# Patient Record
Sex: MALE | Race: WHITE | NOT HISPANIC OR LATINO
[De-identification: names, ages, dates, MRNs, and addresses within clinical notes are randomized per-mention and may not be internally consistent; named-entity substitution may affect disease eponyms.]

---

## 2017-08-15 ENCOUNTER — APPOINTMENT (OUTPATIENT)
Dept: CARDIOLOGY | Facility: CLINIC | Age: 57
End: 2017-08-15
Payer: COMMERCIAL

## 2017-08-15 VITALS
DIASTOLIC BLOOD PRESSURE: 72 MMHG | HEART RATE: 73 BPM | RESPIRATION RATE: 16 BRPM | WEIGHT: 225 LBS | HEIGHT: 71 IN | BODY MASS INDEX: 31.5 KG/M2 | OXYGEN SATURATION: 98 % | SYSTOLIC BLOOD PRESSURE: 130 MMHG

## 2017-08-15 DIAGNOSIS — I73.9 PERIPHERAL VASCULAR DISEASE, UNSPECIFIED: ICD-10-CM

## 2017-08-15 DIAGNOSIS — Z82.3 FAMILY HISTORY OF STROKE: ICD-10-CM

## 2017-08-15 DIAGNOSIS — F17.210 NICOTINE DEPENDENCE, CIGARETTES, UNCOMPLICATED: ICD-10-CM

## 2017-08-15 DIAGNOSIS — I65.21 OCCLUSION AND STENOSIS OF RIGHT CAROTID ARTERY: ICD-10-CM

## 2017-08-15 PROCEDURE — 99205 OFFICE O/P NEW HI 60 MIN: CPT

## 2022-04-19 ENCOUNTER — APPOINTMENT (OUTPATIENT)
Dept: PAIN MANAGEMENT | Facility: CLINIC | Age: 62
End: 2022-04-19
Payer: COMMERCIAL

## 2022-04-19 VITALS — WEIGHT: 235 LBS | HEIGHT: 70 IN | BODY MASS INDEX: 33.64 KG/M2

## 2022-04-19 DIAGNOSIS — M54.50 LOW BACK PAIN, UNSPECIFIED: ICD-10-CM

## 2022-04-19 DIAGNOSIS — M54.17 RADICULOPATHY, LUMBOSACRAL REGION: ICD-10-CM

## 2022-04-19 PROCEDURE — 99213 OFFICE O/P EST LOW 20 MIN: CPT

## 2022-04-19 NOTE — DISCUSSION/SUMMARY
[de-identified] : After discussing various treatment options with the patient including but not limited to oral medications, physical therapy, exercise modalities as well as interventional spinal injections, we have decided with the following plan:\par \par - Continue home exercises, stretching, activity modification, physical therapy, and conservative care.\par - Follow-up as needed.\par - Recommend to follow-up with a spine specialist for surgical consultation.\par - Will prescribe Oxycodone/Acetaminophen 5/325 Q4-6hrs PRN #28 for pain control.\par

## 2022-04-19 NOTE — PHYSICAL EXAM
[de-identified] : Constitutional; Appears well, no apparent distress\par Ability to communicate: Normal \par Respiratory: non-labored breathing\par Skin: No rash noted\par Head: Normocephalic, atraumatic\par Neck: no visible thyroid enlargement\par Eyes: Extraocular movements intact\par Neurologic: Alert and oriented x3\par Psychiatric: normal mood, affect and behavior \par \par  [] : motor exam is 5/5 throughout both lower extremities with normal tone

## 2022-04-19 NOTE — HISTORY OF PRESENT ILLNESS
[Lower back] : lower back [0] : 0 [6] : 6 [Radiating] : radiating [Sharp] : sharp [Tingling] : tingling [] : yes [Constant] : constant [Leisure] : leisure [Sleep] : sleep [Walking/activity] : walking/activity [Injection therapy] : injection therapy [Sitting] : sitting [Lying in bed] : lying in bed [FreeTextEntry1] : left  [FreeTextEntry6] : numbing  [FreeTextEntry7] : left leg to the toes

## 2022-09-21 ENCOUNTER — APPOINTMENT (OUTPATIENT)
Dept: GASTROENTEROLOGY | Facility: CLINIC | Age: 62
End: 2022-09-21

## 2022-09-21 VITALS
OXYGEN SATURATION: 97 % | HEART RATE: 60 BPM | HEIGHT: 71 IN | TEMPERATURE: 97.8 F | SYSTOLIC BLOOD PRESSURE: 142 MMHG | BODY MASS INDEX: 30.94 KG/M2 | RESPIRATION RATE: 12 BRPM | DIASTOLIC BLOOD PRESSURE: 80 MMHG | WEIGHT: 221 LBS

## 2022-09-21 DIAGNOSIS — R07.9 CHEST PAIN, UNSPECIFIED: ICD-10-CM

## 2022-09-21 PROCEDURE — 99204 OFFICE O/P NEW MOD 45 MIN: CPT

## 2022-09-21 RX ORDER — OXYCODONE AND ACETAMINOPHEN 5; 325 MG/1; MG/1
5-325 TABLET ORAL
Qty: 28 | Refills: 0 | Status: DISCONTINUED | COMMUNITY
Start: 2022-04-19 | End: 2022-09-21

## 2022-09-22 NOTE — CURRENT MEDS
[FreeTextEntry1] : Patient has been given a trial of omeprazole which is not helping his symptoms\par Patient is currently taking a blood pressure pill and had a pill for cholesterol the names of which he cannot recall.

## 2022-09-22 NOTE — HISTORY OF PRESENT ILLNESS
[FreeTextEntry1] : Patient has never had a colonoscopy [de-identified] : CT angiogram performed August 17, 2022 revealed diffuse atherosclerotic changes with no discrete luminal stenosis of greater than 50%.

## 2022-09-22 NOTE — REASON FOR VISIT
[Initial Evaluation] : an initial evaluation [FreeTextEntry1] : 62-year-old male complaining of chest pain

## 2022-09-22 NOTE — REVIEW OF SYSTEMS
[Chest Pain] : chest pain [Cough] : cough [Shortness Of Breath] : no shortness of breath [SOB on Exertion] : no shortness of breath during exertion [Abdominal Pain] : no abdominal pain [Vomiting] : no vomiting [Constipation] : no constipation [Diarrhea] : no diarrhea [Heartburn] : no heartburn [Melena (black stool)] : no melena [Bleeding] : no bleeding [Fecal Incontinence (soiling)] : no fecal incontinence [Bloating (gassiness)] : no bloating

## 2022-09-22 NOTE — PHYSICAL EXAM
[Normal] : normal bowel sounds, non-tender, no masses, soft, no no hepato-splenomegaly [de-identified] : + wheeze [de-identified] : Deferred

## 2022-09-22 NOTE — ASSESSMENT
[FreeTextEntry1] : 62-year-old white male with significant history of tobacco use and signs of vascular disease which include lower extremity claudication and right carotid stenotic disease complaining of chest pain which may be cardiac in nature but reportedly work-up to date has not revealed significant cardiac disease.  We will consider upper endoscopy to rule out GI etiology of chest pain after cardiology clearance by his cardiologist Dr. Helms.  Patient has had a trial of omeprazole without improvement of his intermittent chest pain.

## 2022-10-13 ENCOUNTER — OUTPATIENT (OUTPATIENT)
Dept: OUTPATIENT SERVICES | Facility: HOSPITAL | Age: 62
LOS: 1 days | End: 2022-10-13
Payer: COMMERCIAL

## 2022-10-13 ENCOUNTER — TRANSCRIPTION ENCOUNTER (OUTPATIENT)
Age: 62
End: 2022-10-13

## 2022-10-13 VITALS
HEART RATE: 53 BPM | SYSTOLIC BLOOD PRESSURE: 132 MMHG | WEIGHT: 218.04 LBS | HEIGHT: 60.11 IN | RESPIRATION RATE: 15 BRPM | TEMPERATURE: 98 F | DIASTOLIC BLOOD PRESSURE: 83 MMHG | OXYGEN SATURATION: 95 %

## 2022-10-13 VITALS
OXYGEN SATURATION: 97 % | RESPIRATION RATE: 14 BRPM | SYSTOLIC BLOOD PRESSURE: 154 MMHG | HEART RATE: 60 BPM | DIASTOLIC BLOOD PRESSURE: 73 MMHG

## 2022-10-13 DIAGNOSIS — I25.10 ATHEROSCLEROTIC HEART DISEASE OF NATIVE CORONARY ARTERY WITHOUT ANGINA PECTORIS: ICD-10-CM

## 2022-10-13 LAB
ALBUMIN SERPL ELPH-MCNC: 4.7 G/DL — SIGNIFICANT CHANGE UP (ref 3.3–5)
ALP SERPL-CCNC: 80 U/L — SIGNIFICANT CHANGE UP (ref 40–120)
ALT FLD-CCNC: 24 U/L — SIGNIFICANT CHANGE UP (ref 10–45)
ANION GAP SERPL CALC-SCNC: 10 MMOL/L — SIGNIFICANT CHANGE UP (ref 5–17)
AST SERPL-CCNC: 19 U/L — SIGNIFICANT CHANGE UP (ref 10–40)
BILIRUB SERPL-MCNC: 0.3 MG/DL — SIGNIFICANT CHANGE UP (ref 0.2–1.2)
BUN SERPL-MCNC: 21 MG/DL — SIGNIFICANT CHANGE UP (ref 7–23)
CALCIUM SERPL-MCNC: 9.7 MG/DL — SIGNIFICANT CHANGE UP (ref 8.4–10.5)
CHLORIDE SERPL-SCNC: 102 MMOL/L — SIGNIFICANT CHANGE UP (ref 96–108)
CO2 SERPL-SCNC: 29 MMOL/L — SIGNIFICANT CHANGE UP (ref 22–31)
CREAT SERPL-MCNC: 0.72 MG/DL — SIGNIFICANT CHANGE UP (ref 0.5–1.3)
EGFR: 103 ML/MIN/1.73M2 — SIGNIFICANT CHANGE UP
GLUCOSE SERPL-MCNC: 88 MG/DL — SIGNIFICANT CHANGE UP (ref 70–99)
HCT VFR BLD CALC: 46.7 % — SIGNIFICANT CHANGE UP (ref 39–50)
HGB BLD-MCNC: 15.4 G/DL — SIGNIFICANT CHANGE UP (ref 13–17)
MCHC RBC-ENTMCNC: 30.3 PG — SIGNIFICANT CHANGE UP (ref 27–34)
MCHC RBC-ENTMCNC: 33 GM/DL — SIGNIFICANT CHANGE UP (ref 32–36)
MCV RBC AUTO: 91.9 FL — SIGNIFICANT CHANGE UP (ref 80–100)
NRBC # BLD: 0 /100 WBCS — SIGNIFICANT CHANGE UP (ref 0–0)
PLATELET # BLD AUTO: 286 K/UL — SIGNIFICANT CHANGE UP (ref 150–400)
POTASSIUM SERPL-MCNC: 4.3 MMOL/L — SIGNIFICANT CHANGE UP (ref 3.5–5.3)
POTASSIUM SERPL-SCNC: 4.3 MMOL/L — SIGNIFICANT CHANGE UP (ref 3.5–5.3)
PROT SERPL-MCNC: 7.7 G/DL — SIGNIFICANT CHANGE UP (ref 6–8.3)
RBC # BLD: 5.08 M/UL — SIGNIFICANT CHANGE UP (ref 4.2–5.8)
RBC # FLD: 12.2 % — SIGNIFICANT CHANGE UP (ref 10.3–14.5)
SODIUM SERPL-SCNC: 141 MMOL/L — SIGNIFICANT CHANGE UP (ref 135–145)
WBC # BLD: 7.53 K/UL — SIGNIFICANT CHANGE UP (ref 3.8–10.5)
WBC # FLD AUTO: 7.53 K/UL — SIGNIFICANT CHANGE UP (ref 3.8–10.5)

## 2022-10-13 PROCEDURE — 93010 ELECTROCARDIOGRAM REPORT: CPT | Mod: 76

## 2022-10-13 PROCEDURE — C1769: CPT

## 2022-10-13 PROCEDURE — 93005 ELECTROCARDIOGRAM TRACING: CPT

## 2022-10-13 PROCEDURE — 85027 COMPLETE CBC AUTOMATED: CPT

## 2022-10-13 PROCEDURE — 92928 PRQ TCAT PLMT NTRAC ST 1 LES: CPT | Mod: LD

## 2022-10-13 PROCEDURE — 93454 CORONARY ARTERY ANGIO S&I: CPT | Mod: 26,59

## 2022-10-13 PROCEDURE — C9600: CPT | Mod: LD

## 2022-10-13 PROCEDURE — 36415 COLL VENOUS BLD VENIPUNCTURE: CPT

## 2022-10-13 PROCEDURE — C1725: CPT

## 2022-10-13 PROCEDURE — C1894: CPT

## 2022-10-13 PROCEDURE — C1874: CPT

## 2022-10-13 PROCEDURE — 80053 COMPREHEN METABOLIC PANEL: CPT

## 2022-10-13 PROCEDURE — 99152 MOD SED SAME PHYS/QHP 5/>YRS: CPT

## 2022-10-13 PROCEDURE — 93454 CORONARY ARTERY ANGIO S&I: CPT | Mod: 59

## 2022-10-13 PROCEDURE — C1887: CPT

## 2022-10-13 RX ORDER — SODIUM CHLORIDE 9 MG/ML
1000 INJECTION INTRAMUSCULAR; INTRAVENOUS; SUBCUTANEOUS
Refills: 0 | Status: DISCONTINUED | OUTPATIENT
Start: 2022-10-13 | End: 2022-10-27

## 2022-10-13 RX ORDER — CLOPIDOGREL BISULFATE 75 MG/1
1 TABLET, FILM COATED ORAL
Qty: 90 | Refills: 3
Start: 2022-10-13 | End: 2023-10-07

## 2022-10-13 RX ORDER — SODIUM CHLORIDE 9 MG/ML
250 INJECTION INTRAMUSCULAR; INTRAVENOUS; SUBCUTANEOUS ONCE
Refills: 0 | Status: COMPLETED | OUTPATIENT
Start: 2022-10-13 | End: 2022-10-13

## 2022-10-13 RX ADMIN — SODIUM CHLORIDE 750 MILLILITER(S): 9 INJECTION INTRAMUSCULAR; INTRAVENOUS; SUBCUTANEOUS at 10:04

## 2022-10-13 RX ADMIN — SODIUM CHLORIDE 50 MILLILITER(S): 9 INJECTION INTRAMUSCULAR; INTRAVENOUS; SUBCUTANEOUS at 10:05

## 2022-10-13 RX ADMIN — SODIUM CHLORIDE 180 MILLILITER(S): 9 INJECTION INTRAMUSCULAR; INTRAVENOUS; SUBCUTANEOUS at 12:47

## 2022-10-13 NOTE — H&P CARDIOLOGY - NSICDXPASTMEDICALHX_GEN_ALL_CORE_FT
PAST MEDICAL HISTORY:  Carotid stenosis     Emphysematous COPD     HLD (hyperlipidemia)      PAST MEDICAL HISTORY:  Asthma     Carotid stenosis     Emphysematous COPD     HLD (hyperlipidemia)

## 2022-10-13 NOTE — ASU PATIENT PROFILE, ADULT - FALL HARM RISK - UNIVERSAL INTERVENTIONS
Bed in lowest position, wheels locked, appropriate side rails in place/Call bell, personal items and telephone in reach/Instruct patient to call for assistance before getting out of bed or chair/Non-slip footwear when patient is out of bed/[place] to call system/Physically safe environment - no spills, clutter or unnecessary equipment/Purposeful Proactive Rounding/Room/bathroom lighting operational, light cord in reach

## 2022-10-13 NOTE — H&P CARDIOLOGY - RS GEN PE MLT RESP DETAILS PC
airway patent/breath sounds equal/respirations non-labored/diminished breath sounds, L/diminished breath sounds, R

## 2022-10-13 NOTE — H&P CARDIOLOGY - HISTORY OF PRESENT ILLNESS
61 y/o male, current smoker, with PMHx of COPD, stable multivessel CAD, HTN, HLD, and Carotid stenosis offering c/o class II anginal exertional left sided chest pain radiating to the left arm x 6 weeks.  Patient was evaluated by his Cardiologist- Dr. ERICA Helms and is now s/p CTA of the coronaries with total coronary calcium score of 1703 with atherosclerotic calcification distributed as follows:  LM 84, , LCx 251, RCA 1119.  Patient is now for [facility].  61 y/o male, current smoker, with PMHx of Asthma, COPD, stable multivessel CAD, HTN, HLD, and Carotid stenosis offering c/o class II anginal exertional left sided chest pain radiating to the left arm x 6 weeks.  Patient was evaluated by his Cardiologist- Dr. ERICA Helms and is now s/p CTA of the coronaries with total coronary calcium score of 1703 with atherosclerotic calcification distributed as follows:  LM 84, , LCx 251, RCA 1119.  Patient is now for [facility].

## 2022-10-13 NOTE — ASU DISCHARGE PLAN (ADULT/PEDIATRIC) - NS MD DC FALL RISK RISK
For information on Fall & Injury Prevention, visit: [email or web address] OR  [email or web address] OR  https://www.cdc.gov/steadi/patient.html

## 2022-10-13 NOTE — ASU DISCHARGE PLAN (ADULT/PEDIATRIC) - PROCEDURE
Cardiac catheterization with 2 stents to the left anterior descending artery
I supervised PA fellow care

## 2022-10-13 NOTE — ASU DISCHARGE PLAN (ADULT/PEDIATRIC) - CARE PROVIDER_API CALL
Piyush Helms (DO)  Cardiology; Internal Medicine; Nuclear Cardiology  [facility], [address] [place] [address], [place] [address]  Phone: [phone]  Fax: [phone]  Established Patient  Follow Up Time: 2 weeks

## 2022-10-13 NOTE — ASU DISCHARGE PLAN (ADULT/PEDIATRIC) - ASU DC SPECIAL INSTRUCTIONSFT

## 2022-12-13 PROBLEM — J45.909 UNSPECIFIED ASTHMA, UNCOMPLICATED: Chronic | Status: ACTIVE | Noted: 2022-10-13

## 2022-12-13 PROBLEM — I65.29 OCCLUSION AND STENOSIS OF UNSPECIFIED CAROTID ARTERY: Chronic | Status: ACTIVE | Noted: 2022-10-13

## 2022-12-13 PROBLEM — J43.9 EMPHYSEMA, UNSPECIFIED: Chronic | Status: ACTIVE | Noted: 2022-10-13

## 2022-12-13 PROBLEM — E78.5 HYPERLIPIDEMIA, UNSPECIFIED: Chronic | Status: ACTIVE | Noted: 2022-10-13

## 2022-12-20 ENCOUNTER — APPOINTMENT (OUTPATIENT)
Dept: ELECTROPHYSIOLOGY | Facility: CLINIC | Age: 62
End: 2022-12-20

## 2022-12-20 VITALS
SYSTOLIC BLOOD PRESSURE: 142 MMHG | WEIGHT: 228 LBS | BODY MASS INDEX: 31.92 KG/M2 | RESPIRATION RATE: 14 BRPM | OXYGEN SATURATION: 98 % | HEIGHT: 71 IN | DIASTOLIC BLOOD PRESSURE: 90 MMHG | HEART RATE: 91 BPM

## 2022-12-20 PROCEDURE — 93000 ELECTROCARDIOGRAM COMPLETE: CPT

## 2022-12-20 PROCEDURE — 99204 OFFICE O/P NEW MOD 45 MIN: CPT | Mod: 25

## 2022-12-20 RX ORDER — RIVAROXABAN 20 MG/1
20 TABLET, FILM COATED ORAL
Qty: 90 | Refills: 1 | Status: ACTIVE | COMMUNITY

## 2022-12-20 RX ORDER — ATORVASTATIN CALCIUM 40 MG/1
40 TABLET, FILM COATED ORAL
Qty: 90 | Refills: 3 | Status: ACTIVE | COMMUNITY

## 2022-12-20 RX ORDER — CLOPIDOGREL 75 MG/1
75 TABLET, FILM COATED ORAL DAILY
Qty: 90 | Refills: 0 | Status: ACTIVE | COMMUNITY
Start: 2022-12-20

## 2022-12-25 ENCOUNTER — NON-APPOINTMENT (OUTPATIENT)
Age: 62
End: 2022-12-25

## 2022-12-25 NOTE — HISTORY OF PRESENT ILLNESS
[FreeTextEntry1] : I had the pleasure of seeing Mark Oseguera today for consultation for AFL management in the arrhythmia clinic at [facility]. As you well know, he is a pleasant 62 year old gentleman with a history of HTN, CAD s/p PCI to the proximal LAD (10/13/22), hyperlipidemia, peripheral vascular disease, asthma, and newly diagnosed atrial flutter.  About a week ago, patient went to see his cardiologist and complained of occasional palpitations that began about 5 days prior to that visit.  He was found to be in atrial flutter and was started on Xarelto.  Patient reports he has occasional palpitations but otherwise has been feeling well.  He denies chest pain, shortness of breath, near-syncope, or syncope.\par

## 2022-12-25 NOTE — CARDIOLOGY SUMMARY
[de-identified] : 12/20/2022: MAGNO @ 94bpm [de-identified] : 8/3/22: EF 60%, normal LA size (LA size 4.8 cm), and no significant valvular abnormalities.

## 2022-12-25 NOTE — DISCUSSION/SUMMARY
[EKG obtained to assist in diagnosis and management of assessed problem(s)] : EKG obtained to assist in diagnosis and management of assessed problem(s) [FreeTextEntry1] : In summary, Mr. Oseguera is a 62 year old gentleman with a history of HTN, CAD s/p PCI to the proximal LAD (10/13/22), hyperlipidemia, peripheral vascular disease, asthma, and newly diagnosed atrial flutter. We discussed the pathophysiology of atrial flutter including management strategies. The options of a cardioversion and a catheter ablation were discussed. Risks and benefits were discussed with each option. The procedures, outcomes and risks of ablation were reviewed.  We discussed that at the very least he should proceed with a cardioversion and assess if he feels any improvement in his functional status and symptoms.  Patient would like to discuss further with his wife at home and will contact us with his decision on how he would like to proceed.\par \par

## 2023-01-18 ENCOUNTER — NON-APPOINTMENT (OUTPATIENT)
Age: 63
End: 2023-01-18

## 2023-01-20 ENCOUNTER — TRANSCRIPTION ENCOUNTER (OUTPATIENT)
Age: 63
End: 2023-01-20

## 2023-01-20 ENCOUNTER — OUTPATIENT (OUTPATIENT)
Dept: INPATIENT UNIT | Facility: HOSPITAL | Age: 63
LOS: 1 days | End: 2023-01-20
Payer: COMMERCIAL

## 2023-01-20 VITALS
OXYGEN SATURATION: 97 % | RESPIRATION RATE: 16 BRPM | HEART RATE: 68 BPM | DIASTOLIC BLOOD PRESSURE: 62 MMHG | TEMPERATURE: 98 F | SYSTOLIC BLOOD PRESSURE: 104 MMHG

## 2023-01-20 VITALS — HEIGHT: 71 IN | WEIGHT: 227.96 LBS

## 2023-01-20 DIAGNOSIS — Z95.5 PRESENCE OF CORONARY ANGIOPLASTY IMPLANT AND GRAFT: Chronic | ICD-10-CM

## 2023-01-20 DIAGNOSIS — I48.3 TYPICAL ATRIAL FLUTTER: ICD-10-CM

## 2023-01-20 LAB
ANION GAP SERPL CALC-SCNC: 11 MMOL/L — SIGNIFICANT CHANGE UP (ref 5–17)
APTT BLD: 39.3 SEC — HIGH (ref 27.5–35.5)
BLD GP AB SCN SERPL QL: NEGATIVE — SIGNIFICANT CHANGE UP
BUN SERPL-MCNC: 21 MG/DL — SIGNIFICANT CHANGE UP (ref 7–23)
CALCIUM SERPL-MCNC: 9.6 MG/DL — SIGNIFICANT CHANGE UP (ref 8.4–10.5)
CHLORIDE SERPL-SCNC: 105 MMOL/L — SIGNIFICANT CHANGE UP (ref 96–108)
CO2 SERPL-SCNC: 22 MMOL/L — SIGNIFICANT CHANGE UP (ref 22–31)
CREAT SERPL-MCNC: 0.62 MG/DL — SIGNIFICANT CHANGE UP (ref 0.5–1.3)
EGFR: 108 ML/MIN/1.73M2 — SIGNIFICANT CHANGE UP
GLUCOSE SERPL-MCNC: 92 MG/DL — SIGNIFICANT CHANGE UP (ref 70–99)
HCT VFR BLD CALC: 43.2 % — SIGNIFICANT CHANGE UP (ref 39–50)
HGB BLD-MCNC: 14.1 G/DL — SIGNIFICANT CHANGE UP (ref 13–17)
INR BLD: 1.66 RATIO — HIGH (ref 0.88–1.16)
MAGNESIUM SERPL-MCNC: 2.1 MG/DL — SIGNIFICANT CHANGE UP (ref 1.6–2.6)
MCHC RBC-ENTMCNC: 30 PG — SIGNIFICANT CHANGE UP (ref 27–34)
MCHC RBC-ENTMCNC: 32.6 GM/DL — SIGNIFICANT CHANGE UP (ref 32–36)
MCV RBC AUTO: 91.9 FL — SIGNIFICANT CHANGE UP (ref 80–100)
NRBC # BLD: 0 /100 WBCS — SIGNIFICANT CHANGE UP (ref 0–0)
PLATELET # BLD AUTO: 426 K/UL — HIGH (ref 150–400)
POTASSIUM SERPL-MCNC: 5 MMOL/L — SIGNIFICANT CHANGE UP (ref 3.5–5.3)
POTASSIUM SERPL-SCNC: 5 MMOL/L — SIGNIFICANT CHANGE UP (ref 3.5–5.3)
PROTHROM AB SERPL-ACNC: 19.2 SEC — HIGH (ref 10.5–13.4)
RBC # BLD: 4.7 M/UL — SIGNIFICANT CHANGE UP (ref 4.2–5.8)
RBC # FLD: 12.3 % — SIGNIFICANT CHANGE UP (ref 10.3–14.5)
RH IG SCN BLD-IMP: POSITIVE — SIGNIFICANT CHANGE UP
RH IG SCN BLD-IMP: POSITIVE — SIGNIFICANT CHANGE UP
SODIUM SERPL-SCNC: 138 MMOL/L — SIGNIFICANT CHANGE UP (ref 135–145)
WBC # BLD: 9.38 K/UL — SIGNIFICANT CHANGE UP (ref 3.8–10.5)
WBC # FLD AUTO: 9.38 K/UL — SIGNIFICANT CHANGE UP (ref 3.8–10.5)

## 2023-01-20 PROCEDURE — 86901 BLOOD TYPING SEROLOGIC RH(D): CPT

## 2023-01-20 PROCEDURE — 86850 RBC ANTIBODY SCREEN: CPT

## 2023-01-20 PROCEDURE — 85027 COMPLETE CBC AUTOMATED: CPT

## 2023-01-20 PROCEDURE — 33285 INSJ SUBQ CAR RHYTHM MNTR: CPT

## 2023-01-20 PROCEDURE — C1764: CPT

## 2023-01-20 PROCEDURE — 83735 ASSAY OF MAGNESIUM: CPT

## 2023-01-20 PROCEDURE — 85610 PROTHROMBIN TIME: CPT

## 2023-01-20 PROCEDURE — 93005 ELECTROCARDIOGRAM TRACING: CPT

## 2023-01-20 PROCEDURE — 80048 BASIC METABOLIC PNL TOTAL CA: CPT

## 2023-01-20 PROCEDURE — 85730 THROMBOPLASTIN TIME PARTIAL: CPT

## 2023-01-20 PROCEDURE — 93653 COMPRE EP EVAL TX SVT: CPT

## 2023-01-20 PROCEDURE — 86900 BLOOD TYPING SEROLOGIC ABO: CPT

## 2023-01-20 RX ORDER — SODIUM CHLORIDE 9 MG/ML
3 INJECTION INTRAMUSCULAR; INTRAVENOUS; SUBCUTANEOUS EVERY 8 HOURS
Refills: 0 | Status: DISCONTINUED | OUTPATIENT
Start: 2023-01-20 | End: 2023-01-20

## 2023-01-20 RX ADMIN — SODIUM CHLORIDE 3 MILLILITER(S): 9 INJECTION INTRAMUSCULAR; INTRAVENOUS; SUBCUTANEOUS at 14:13

## 2023-01-20 NOTE — H&P CARDIOLOGY - HISTORY OF PRESENT ILLNESS
63 y/o male former smoker (30 PYH) with a pmh of HTN, HLD, CAD s/p PCI/VIRAL x 1 mLAD 99% on Plavix only, Asthma, COPD, Carotid stenosis with newly diagnosed Atrial Flutter on Xarelto 20mg daily (last dose 1/20 at 430AM) followed by Dr. Helms, Cardiology and Dr. Rendon, PCP referred to Dr. Hennessy, EP with reports of "fluttering in chest" with occasional palpitations with no reports of cp or sob.  His TTE 8/22 with normal LVEF 60% and no significant valvular abnormalities.  He presents for AFL Typical ablation.

## 2023-01-20 NOTE — PATIENT PROFILE ADULT - OVER THE PAST TWO WEEKS HAVE YOU FELT DOWN, DEPRESSED OR HOPELESS?
----- Message from Angela Lopez MD sent at 5/28/2020  5:34 AM CDT -----  Prolactin was entered/abstracted, I believe we received some lab via fax, and others are simply available in care everywhere.    I had ordered a DHEAS but a DHEA is resulted.  Those are 2 different tests. Can a DHEAS be added?      
Please see other phone note - can a dheas be added? It looks like chast[facility] called but answer is pending.  She will need to get redrawn if not.  
no

## 2023-01-20 NOTE — PRE-ANESTHESIA EVALUATION ADULT - NSANTHGENDERRD_ENT_A_CORE
Reason for Call:  Form, our goal is to have forms completed with 72 hours, however, some forms may require a visit or additional information.    Type of letter, form or note:  medical    Who is the form from?: Home care    Where did the form come from: form was faxed in    What clinic location was the form placed at?: [place]    Where the form was placed: Dr Hopkins Box/Folder    What number is listed as a contact on the form?: [phone]       Additional comments:     Call taken on 3/17/2021 at 8:45 AM by Colleen Lemos       Yes

## 2023-01-20 NOTE — ASU DISCHARGE PLAN (ADULT/PEDIATRIC) - PROVIDER TOKENS
PROVIDER:[TOKEN:[[id number]],SCHEDULEDAPPT:[04/11/2023],SCHEDULEDAPPTTIME:[02:30 PM],ESTABLISHEDPATIENT:[T]] PROVIDER:[TOKEN:[[id number]],SCHEDULEDAPPT:[04/11/2023],SCHEDULEDAPPTTIME:[02:30 PM],ESTABLISHEDPATIENT:[T]],FREE:[LAST:[Wound check at [facility] with ACP],PHONE:[phone]],FAX:[(   )    -],ADDRESS:[[address] [place] [address]],SCHEDULEDAPPT:[02/01/2023],SCHEDULEDAPPTTIME:[03:40 PM],ESTABLISHEDPATIENT:[T]]

## 2023-01-20 NOTE — ASU DISCHARGE PLAN (ADULT/PEDIATRIC) - ASU DC SPECIAL INSTRUCTIONSFT
WOUND CARE:  The day AFTER your procedure  - Remove the bandage at the site GENTLY, clean with mild soap and water, and pat dry; leave open to air  - You may shower   - DO NOT apply lotions, creams, ointments, powder, perfumes to your incision site  - Check your groin every day. A small amount of bruising or soreness is normal, a bump (smaller than nickel) might be present which is normal  - DO NOT SOAK your site for 1 week (no baths, no pools, no tubs, etc..)    ACTIVITY:  Your procedure was done through your groin  For the next 7 DAYS:  - Limit climbing stairs, no strenuous activity, pushing , pulling, or straining (especially during bowel movements)  - DO NOT LIFT anything 10 lbs or heavier   - You may resume sexual activity in 7 days, unless instructed otherwise    Mild palpitations are normal     Follow heart healthy diet recommended by your doctor, , if you smoke STOP SMOKING (may call [phone] for center of tobacco control if you need assistance).  For the next 24 hours:   - Stay at home and rest, do not drive or operate heavy machinery   Do not drink alcoholic beverages   Do not make important personal or business decisions     ***CALL YOUR DOCTOR ***  IF you have fever, chills, body aches, or severe pain, swelling, redness, heat, yellow drainage from your incision site  IF bleeding or significant new swelling from your puncture site  IF you experience rapid heartbeat or palpitations that cause: lightheadedness, dizziness, or fainting spell.  IF you experience difficulty swallowing, or pain with swallowing   IF unable to get in contact with your doctor, you may call the Cardiology Office at [facility] at [phone] See Preprinted discharge instruction sheet

## 2023-01-20 NOTE — PATIENT PROFILE ADULT - FALL HARM RISK - UNIVERSAL INTERVENTIONS
Bed in lowest position, wheels locked, appropriate side rails in place/Call bell, personal items and telephone in reach/Instruct patient to call for assistance before getting out of bed or chair/Non-slip footwear when patient is out of bed/[place] to call system/Physically safe environment - no spills, clutter or unnecessary equipment/Purposeful Proactive Rounding/Room/bathroom lighting operational, light cord in reach

## 2023-01-20 NOTE — CHART NOTE - NSCHARTNOTEFT_GEN_A_CORE
Pt s/p AFL ablation via RFV with groin site stable with no hematoma/bleeding and ILR site stable  Pt with soft BP post procedure.    Lying BP 89/59  MAP 66  Sitting     96/65  MAP 72  Dangling  116/71  MAP 82  Standing  111/70  MAP 79    Tele NSR 70s with no ectopy    Pt ambulated around unit with no sob, dizziness or palpitations.  Feeling well.    Plan:  Discussed with Dr. Hennessy.    D/C home f/u for wound check as scheduled on 2/1 at 1540 and with Dr. Hennessy on 4/11.  Pt will clarify at his wound check if Dr. Hennessy wants to see him sooner.      Ayde Coto, [facility]-BC  Cardiology

## 2023-01-20 NOTE — ASU DISCHARGE PLAN (ADULT/PEDIATRIC) - NS MD DC FALL RISK RISK
For information on Fall & Injury Prevention, visit: [email or web address] OR  [email or web address] OR  https://www.cdc.gov/steadi/patient.html

## 2023-01-20 NOTE — ASU DISCHARGE PLAN (ADULT/PEDIATRIC) - CARE PROVIDER_API CALL
Masood Hennessy)  Cardiac Electrophysiology; Cardiology  [address] [place] [address]  Phone: [phone]  Fax: [phone]  Established Patient  Scheduled Appointment: 04/11/2023 02:30 PM   Masood Hennessy)  Cardiac Electrophysiology; Cardiology  [address] [place] [address]  Phone: [phone]  Fax: [phone]  Established Patient  Scheduled Appointment: 04/11/2023 02:30 PM    Wound check at [facility] with ACP,   [address] [place] [address]  Phone: [phone]  Fax: (   )    -  Established Patient  Scheduled Appointment: 02/01/2023 03:40 PM

## 2023-01-20 NOTE — H&P CARDIOLOGY - NSICDXPASTMEDICALHX_GEN_ALL_CORE_FT
PAST MEDICAL HISTORY:  Asthma     Atrial flutter     Carotid stenosis     Emphysematous COPD     HLD (hyperlipidemia)

## 2023-01-20 NOTE — H&P CARDIOLOGY - NSICDXFAMHXNEG_GEN_ALL
Labs collected. IV established, pt medicated, and IVF infusing via gravity w/o difficulty. Pt instructed to keep arm straight d/t position and placement of peripheral line. Pt able to verbalize understanding. Pt provided w/ warm blankets and lights dimmed for comfort. Urine sample requested, pt unable to provide at this time. Pt resting upright comfortably on the stretcher in a position of comfort, appears in no acute distress while talking on cell phone. RR even and unlabored, skin warm and dry. Call bell within reach, will continue to monitor. coronary disease

## 2023-01-21 ENCOUNTER — NON-APPOINTMENT (OUTPATIENT)
Age: 63
End: 2023-01-21

## 2023-01-23 RX ORDER — RIVAROXABAN 15 MG-20MG
1 KIT ORAL
Qty: 0 | Refills: 0 | DISCHARGE

## 2023-02-01 ENCOUNTER — APPOINTMENT (OUTPATIENT)
Dept: ELECTROPHYSIOLOGY | Facility: CLINIC | Age: 63
End: 2023-02-01
Payer: COMMERCIAL

## 2023-02-01 ENCOUNTER — NON-APPOINTMENT (OUTPATIENT)
Age: 63
End: 2023-02-01

## 2023-02-01 VITALS
HEART RATE: 59 BPM | DIASTOLIC BLOOD PRESSURE: 92 MMHG | HEIGHT: 71 IN | SYSTOLIC BLOOD PRESSURE: 156 MMHG | OXYGEN SATURATION: 96 % | BODY MASS INDEX: 32.48 KG/M2 | WEIGHT: 232 LBS

## 2023-02-01 PROCEDURE — 93000 ELECTROCARDIOGRAM COMPLETE: CPT | Mod: 59

## 2023-02-01 PROCEDURE — 99212 OFFICE O/P EST SF 10 MIN: CPT

## 2023-02-01 PROCEDURE — 93291 INTERROG DEV EVAL SCRMS IP: CPT

## 2023-02-05 ENCOUNTER — NON-APPOINTMENT (OUTPATIENT)
Age: 63
End: 2023-02-05

## 2023-02-14 ENCOUNTER — NON-APPOINTMENT (OUTPATIENT)
Age: 63
End: 2023-02-14

## 2023-03-06 ENCOUNTER — APPOINTMENT (OUTPATIENT)
Dept: ELECTROPHYSIOLOGY | Facility: CLINIC | Age: 63
End: 2023-03-06
Payer: COMMERCIAL

## 2023-03-06 ENCOUNTER — NON-APPOINTMENT (OUTPATIENT)
Age: 63
End: 2023-03-06

## 2023-03-06 PROBLEM — I48.92 UNSPECIFIED ATRIAL FLUTTER: Chronic | Status: ACTIVE | Noted: 2023-01-20

## 2023-03-06 PROCEDURE — G2066: CPT

## 2023-03-06 PROCEDURE — 93298 REM INTERROG DEV EVAL SCRMS: CPT

## 2023-04-11 ENCOUNTER — APPOINTMENT (OUTPATIENT)
Dept: ELECTROPHYSIOLOGY | Facility: CLINIC | Age: 63
End: 2023-04-11
Payer: COMMERCIAL

## 2023-04-25 ENCOUNTER — NON-APPOINTMENT (OUTPATIENT)
Age: 63
End: 2023-04-25

## 2023-04-25 ENCOUNTER — APPOINTMENT (OUTPATIENT)
Dept: ELECTROPHYSIOLOGY | Facility: CLINIC | Age: 63
End: 2023-04-25
Payer: COMMERCIAL

## 2023-04-25 VITALS
HEIGHT: 71 IN | SYSTOLIC BLOOD PRESSURE: 161 MMHG | WEIGHT: 238 LBS | HEART RATE: 61 BPM | BODY MASS INDEX: 33.32 KG/M2 | OXYGEN SATURATION: 97 % | DIASTOLIC BLOOD PRESSURE: 90 MMHG

## 2023-04-25 PROCEDURE — 99214 OFFICE O/P EST MOD 30 MIN: CPT

## 2023-04-25 PROCEDURE — 93285 PRGRMG DEV EVAL SCRMS IP: CPT

## 2023-04-25 RX ORDER — RAMIPRIL 10 MG/1
10 CAPSULE ORAL
Qty: 90 | Refills: 3 | Status: DISCONTINUED | COMMUNITY
End: 2023-04-25

## 2023-04-26 NOTE — DISCUSSION/SUMMARY
[FreeTextEntry1] : Mr. Oseguera is a 62 year old gentleman with a history of HTN, CAD s/p PCI to the proximal LAD (10/13/22), hyperlipidemia, peripheral vascular disease, asthma, and newly diagnosed atrial flutter.  on 1/20/2023 he underwent EP testing and ablation (caval tricuspid isthmus ablation).  He is doing well post ablation and ILR shows no evidence of atrial fibrillation.  We discussed possible use of the information of this device for shared decision making as a mechanism to follow off of oral AC.  We discussed the limitations of this strategy including a potential delay in diagnosis and communication of a recurrence of arrhythmia.  He is apprehensive given his family history of CVA and prefers to take oral anticoagulation (Xarelto).  Follow up in 6 months.

## 2023-04-26 NOTE — CARDIOLOGY SUMMARY
[de-identified] : today:SR\par 12/20/2022: MAGNO @ 94bpm [de-identified] : 8/3/22: EF 60%, normal LA size (LA size 4.8 cm), and no significant valvular abnormalities. [de-identified] : 1/20/2023\par Successful termination of counterclockwise, cavotricuspid isthmus dependent right atrial flutter with RF ablation\par Successful creation of a line of durable, rate-independent, bidirectional block across the cavotricuspid isthmus with RF ablation

## 2023-04-26 NOTE — HISTORY OF PRESENT ILLNESS
[FreeTextEntry1] : Mr. Oseguera is a 62 year old gentleman with a history of HTN, CAD s/p PCI to the proximal LAD (10/13/22), hyperlipidemia, peripheral vascular disease, asthma, and newly diagnosed atrial flutter.  on 1/20/2023 he underwent EP testing and ablation (caval tricuspid isthmus ablation).  \par \par No complaints. NO chest pain. No shortness of breath.  No lightheadedness/dizziness. The groin is well healed.

## 2023-05-12 ENCOUNTER — APPOINTMENT (OUTPATIENT)
Dept: ELECTROPHYSIOLOGY | Facility: CLINIC | Age: 63
End: 2023-05-12
Payer: COMMERCIAL

## 2023-05-12 ENCOUNTER — NON-APPOINTMENT (OUTPATIENT)
Age: 63
End: 2023-05-12

## 2023-05-12 PROCEDURE — 93298 REM INTERROG DEV EVAL SCRMS: CPT

## 2023-05-12 PROCEDURE — G2066: CPT

## 2023-06-16 ENCOUNTER — NON-APPOINTMENT (OUTPATIENT)
Age: 63
End: 2023-06-16

## 2023-06-16 ENCOUNTER — APPOINTMENT (OUTPATIENT)
Dept: ELECTROPHYSIOLOGY | Facility: CLINIC | Age: 63
End: 2023-06-16
Payer: COMMERCIAL

## 2023-06-16 PROCEDURE — 93298 REM INTERROG DEV EVAL SCRMS: CPT

## 2023-06-16 PROCEDURE — G2066: CPT

## 2023-07-18 NOTE — ASU PREOP CHECKLIST - ASSESSMENT, HISTORY & PHYSICAL COMPLETED AND ON MEDICAL RECORD
Quality 226: Preventive Care And Screening: Tobacco Use: Screening And Cessation Intervention: Patient screened for tobacco use and is an ex/non-smoker
Quality 130: Documentation Of Current Medications In The Medical Record: Current Medications Documented
Detail Level: Detailed
done

## 2023-07-21 ENCOUNTER — APPOINTMENT (OUTPATIENT)
Dept: ELECTROPHYSIOLOGY | Facility: CLINIC | Age: 63
End: 2023-07-21
Payer: COMMERCIAL

## 2023-07-21 ENCOUNTER — NON-APPOINTMENT (OUTPATIENT)
Age: 63
End: 2023-07-21

## 2023-07-21 PROCEDURE — 93298 REM INTERROG DEV EVAL SCRMS: CPT

## 2023-07-21 PROCEDURE — G2066: CPT

## 2023-08-11 NOTE — PACU DISCHARGE NOTE - NS MD DISCHARGE NOTE DISCHARGE
Assessment/Plan:   Diagnoses and all orders for this visit:    Primary osteoarthritis of both knees  -     Large joint arthrocentesis: bilateral knee    Other orders  -     Large joint arthrocentesis         Discussed with patient that today's physical exam is consistent with flare-up of primary osteoarthritis of the bilateral shoulders and bilateral knees. Patient was offered, and accepted, Kenalog and Marcaine injection(s) to the bilateral knees and bilateral subacromial bursae for relief of pain and inflammation. Patient tolerated treatment(s) well. She will be seen in 3 months for re-evaluation and consideration for repeat injections as necessary. Patient expresses understanding and is in agreement with this treatment plan. Under aseptic technique, both knees and both shoulders were injected with Kenalog and Marcaine. She tolerated procedures quite well. Return back in 3 months for evaluation. Subjective:   Patient ID: John Older  1940     HPI  Patient is a 80 y.o. female who presents for follow-up evaluation of osteoarthritis of bilateral shoulders and knees. She was last seen in regards to this issue on 5/12/2023 at which time she received corticosteroid injections. She states that she got significant relief following those injections, unfortunately her symptoms returned approximately 1 month ago. On today's presentation she reports pain in both shoulders, and medial knee pain in both knees. Symptoms are exacerbated with weightbearing or overhead motion. She presents in a wheel chair. She denies any associated numbness or tingling.     The following portions of the patient's history were reviewed and updated as appropriate:  Past medical history, past surgical history, Family history, social history, current medications and allergies    Past Medical History:   Diagnosis Date   • Ambulates with cane    • Cancer ([address])    • Chronic pain disorder     knees/ shoulders (gets inj every 3 mos) • Closed intertrochanteric fracture of right femur ([address]) 5/26/2020   • Controlled type 2 diabetes mellitus with diabetic polyneuropathy, with long-term current use of insulin ([address]) 5/21/2008   • Diabetes mellitus ([address])    • Diabetic polyneuropathy ([address]) 5/21/2008    ICD10 clean up   • Disease of thyroid gland    • H/O oral cancer 2008    Left lower lip   • HL (hearing loss)    • Hodgkin's disease ([address]) 2008    Left neck, had radiation   • Hypertension    • Neuropathy    • Osteoporosis    • RA (rheumatoid arthritis) ([address])    • Traumatic rhabdomyolysis ([address]) 10/17/2022       Past Surgical History:   Procedure Laterality Date   • ADENOIDECTOMY     • APPENDECTOMY     • CATARACT EXTRACTION     • CATARACT EXTRACTION, BILATERAL     • CHOLECYSTECTOMY     • FRACTURE SURGERY Right     hip   • MOUTH SURGERY      oral cancer left lower lip   • OVARY SURGERY     • RI ADJT TIS TRNS/REARGMT F/C/C/M/N/A/G/H/F 10SQCM/< N/A 3/28/2022    Procedure: Adjacent tissue transfer face;  Surgeon: Edin Marsh MD;  Location: [facility];  Service: ENT   • RI OPTX FEM SHFT FX W/INSJ IMED IMPLT W/WO SCREW Right 5/28/2020    Procedure: INSERTION NAIL IM FEMUR ANTEGRADE (TROCHANTERIC); Surgeon: Mary Huynh DO;  Location: [facility];  Service: Orthopedics   • RI TRANSMASTOID ANTROTOMY Left 3/28/2022    Procedure: MASTOIDECTOMY;  Surgeon: Edin Marsh MD;  Location: [facility];  Service: ENT   • TONSILLECTOMY     • TOTAL THYROIDECTOMY  2008    Performed after left neck dissection and left oral cancer diagnosis       Family History   Problem Relation Age of Onset   • Cancer Mother    • Diabetes Mother    • Diabetes Father    • Hypertension Father        Social History     Socioeconomic History   • Marital status:       Spouse name: None   • Number of children: None   • Years of education: None   • Highest education level: None   Occupational History   • None   Tobacco Use   • Smoking status: Never   • Smokeless tobacco: Never   Vaping Use   • Vaping Use: Never used   Substance and Sexual Activity   • Alcohol use: Not Currently     Alcohol/week: 0.0 standard drinks of alcohol   • Drug use: Never   • Sexual activity: Not Currently   Other Topics Concern   • None   Social History Narrative   • None     Social Determinants of Health     Financial Resource Strain: Not on file   Food Insecurity: No Food Insecurity (7/1/2023)    Hunger Vital Sign    • Worried About Running Out of Food in the Last Year: Never true    • Ran Out of Food in the Last Year: Never true   Transportation Needs: No Transportation Needs (7/1/2023)    PRAPARE - Transportation    • Lack of Transportation (Medical): No    • Lack of Transportation (Non-Medical):  No   Physical Activity: Not on file   Stress: Not on file   Social Connections: Not on file   Intimate Partner Violence: Not on file   Housing Stability: Low Risk  (7/1/2023)    Housing Stability Vital Sign    • Unable to Pay for Housing in the Last Year: No    • Number of Places Lived in the Last Year: 1    • Unstable Housing in the Last Year: No         Current Outpatient Medications:   •  Ascorbic Acid (vitamin C) 100 MG tablet, Take 500 mg by mouth 2 (two) times a day, Disp: , Rfl:   •  atorvastatin (LIPITOR) 20 mg tablet, Take 20 mg by mouth daily with dinner , Disp: , Rfl:   •  BD Insulin Syringe U/F 1/2Unit 31G X 5/16" 0.3 ML MISC, 4 (four) times a day, Disp: , Rfl:   •  calcium carbonate (OS-FELICE) 600 MG tablet, Take 600 mg by mouth 2 (two) times a day with meals, Disp: , Rfl:   •  cyanocobalamin (VITAMIN B-12) 1000 MCG tablet, Take 1,000 mcg by mouth daily, Disp: , Rfl:   •  gabapentin (NEURONTIN) 300 mg capsule, Take 300 mg by mouth 3 (three) times a day, Disp: , Rfl:   •  Insulin Glargine Solostar (Basaglar KwikPen) 100 UNIT/ML SOPN, Inject under the skin 10 units AM 5 units PM, Disp: , Rfl:   •  ipratropium (ATROVENT) 0.03 % nasal spray, 2 sprays into each nostril every 12 (twelve) hours (Patient taking differently: 2 sprays into each nostril every 12 (twelve) hours As needed), Disp: 30 mL, Rfl: 11  •  levothyroxine 100 mcg tablet, Take 75 mcg by mouth every morning, Disp: , Rfl:   •  lisinopril (ZESTRIL) 2.5 mg tablet, Take 2.5 mg by mouth daily, Disp: , Rfl:   •  meclizine (ANTIVERT) 25 mg tablet, Take 1 tablet (25 mg total) by mouth every 8 (eight) hours as needed for dizziness, Disp: 30 tablet, Rfl: 0  •  methocarbamol (ROBAXIN) 500 mg tablet, Take 500 mg by mouth in the morning. Prn ., Disp: , Rfl:   •  Multiple Vitamin (multivitamin) capsule, Take 1 capsule by mouth daily, Disp: , Rfl:   •  mupirocin (BACTROBAN) 2 % ointment, Apply topically daily To affected area, Disp: 22 g, Rfl: 2  •  OneTouch Ultra test strip, USE TO TEST BLOOD SUGAR 6 TIMES A DAY, Disp: , Rfl:   •  pantoprazole (PROTONIX) 40 mg tablet, Take 1 tablet (40 mg total) by mouth 2 (two) times a day, Disp: 60 tablet, Rfl: 0  •  VITAMIN D PO, Take 125 mg by mouth in the morning, Disp: , Rfl:     No Known Allergies    Review of Systems   Constitutional: Negative for chills, fever and unexpected weight change. HENT: Negative for hearing loss, nosebleeds and sore throat. Eyes: Negative for pain, redness and visual disturbance. Respiratory: Negative for cough, shortness of breath and wheezing. Cardiovascular: Negative for chest pain, palpitations and leg swelling. Gastrointestinal: Negative for abdominal pain, nausea and vomiting. Endocrine: Negative for polydipsia and polyuria. Genitourinary: Negative for dysuria and hematuria. Musculoskeletal:        As noted in HPI   Skin: Negative for rash and wound. Neurological: Negative for dizziness, numbness and headaches. Psychiatric/Behavioral: Negative for decreased concentration and suicidal ideas. The patient is not nervous/anxious.          Objective:  /76   Pulse 82   Ht 5' 1" (1.549 m)   BMI 21.12 kg/m²     Ortho Exam  Bilateral knees -   Patient presents in wheelchair  Skin is warm and dry to touch with no signs of erythema, ecchymosis, infection  Mild soft tissue swelling, mild effusion noted  ROM 5° - 115°  TTP over medial joint lines, mildly TTP over lateral joint lines  Flexor and extensor mechanisms intact  Knee is stable to varus and valgus stress  - Lachman's  - Anterior Drawer, - Posterior Drawer  - medial Ene's, - lateral Ene's  - Pivot Shift  Patella tracks centrally with palpable crepitus  Calf compartments are soft and supple  2+ TP and DP pulses with brisk capillary refill to the toes  Sural, saphenous, tibial, superficial and deep peroneal motor and sensory distributions intact  Sensation light touch intact distally    Bilateral shoulder -   No anatomical deformity  Skin is warm and dry to touch with no signs of erythema, ecchymosis, or infection  No soft tissue swelling or effusion noted  Positive palpable crepitus with passive motion  TTP over AC joint, TTP over posterior capsules, TTP over anterior acromion  ROM FF 0° - 110°, ABD 0° - 110°, ER 0° - 30°  MMT 4/5 rotator cuff  - glenohumeral instability appreciated on exam  Demonstrates normal elbow, wrist, and finger motion  2+ distal radial pulse with brisk capillary refill to the fingers  Radial, median, and ulnar motor and sensory distributions intact  Sensation to light touch intact distally      Physical Exam  Vitals and nursing note reviewed. Constitutional:       General: She is not in acute distress. Appearance: She is well-developed. HENT:      Head: Normocephalic and atraumatic. Eyes:      Conjunctiva/sclera: Conjunctivae normal.   Cardiovascular:      Rate and Rhythm: Normal rate. Pulmonary:      Effort: Pulmonary effort is normal.   Musculoskeletal:      Cervical back: Neck supple. Skin:     General: Skin is warm and dry. Capillary Refill: Capillary refill takes less than 2 seconds.    Neurological:      Mental Status: She is alert and oriented to person, place, and time. Psychiatric:         Mood and Affect: Mood normal.         Behavior: Behavior normal.          Diagnostic Test Review:  No new imaging reviewed this visit    Large joint arthrocentesis: bilateral knee  Universal Protocol:  Consent: Verbal consent obtained. Risks and benefits: risks, benefits and alternatives were discussed  Consent given by: patient  Timeout called at: 8/11/2023 11:05 AM.  Patient understanding: patient states understanding of the procedure being performed  Site marked: the operative site was marked  Patient identity confirmed: verbally with patient    Supporting Documentation  Indications: pain and joint swelling   Procedure Details  Location: knee - bilateral knee  Needle gauge: 21 G. Ultrasound guidance: no  Approach: anterolateral    Medications (Right): 4 mL bupivacaine 0.25 %; 80 mg triamcinolone acetonide 40 mg/mLMedications (Left): 4 mL bupivacaine 0.25 %; 80 mg triamcinolone acetonide 40 mg/mL   Patient tolerance: patient tolerated the procedure well with no immediate complications  Dressing:  Sterile dressing applied    Large joint arthrocentesis: bilateral subacromial bursa  Universal Protocol:  Consent: Verbal consent obtained. Risks and benefits: risks, benefits and alternatives were discussed  Consent given by: patient  Timeout called at: 8/11/2023 11:06 AM.  Patient understanding: patient states understanding of the procedure being performed  Site marked: the operative site was marked  Patient identity confirmed: verbally with patient    Supporting Documentation  Indications: pain and joint swelling   Procedure Details  Location: shoulder - bilateral subacromial bursa  Needle gauge: 21 G.   Ultrasound guidance: no  Approach: anterolateral    Medications (Right): 4 mL bupivacaine 0.25 %; 80 mg triamcinolone acetonide 40 mg/mLMedications (Left): 4 mL bupivacaine 0.25 %; 80 mg triamcinolone acetonide 40 mg/mL   Patient tolerance: patient tolerated the procedure well with no immediate complications  Dressing:  Sterile dressing applied           Scribe Attestation    I,:  Peyman Markham am acting as a scribe while in the presence of the attending physician.:       I,:  Dixie Santoyo DO personally performed the services described in this documentation    as scribed in my presence.: Floor

## 2023-08-21 ENCOUNTER — APPOINTMENT (OUTPATIENT)
Dept: ELECTROPHYSIOLOGY | Facility: CLINIC | Age: 63
End: 2023-08-21
Payer: COMMERCIAL

## 2023-08-21 ENCOUNTER — NON-APPOINTMENT (OUTPATIENT)
Age: 63
End: 2023-08-21

## 2023-08-22 PROCEDURE — G2066: CPT

## 2023-08-22 PROCEDURE — 93298 REM INTERROG DEV EVAL SCRMS: CPT

## 2023-09-22 ENCOUNTER — NON-APPOINTMENT (OUTPATIENT)
Age: 63
End: 2023-09-22

## 2023-09-22 ENCOUNTER — APPOINTMENT (OUTPATIENT)
Dept: ELECTROPHYSIOLOGY | Facility: CLINIC | Age: 63
End: 2023-09-22
Payer: COMMERCIAL

## 2023-09-23 PROCEDURE — 93298 REM INTERROG DEV EVAL SCRMS: CPT

## 2023-09-23 PROCEDURE — G2066: CPT

## 2023-10-16 ENCOUNTER — NON-APPOINTMENT (OUTPATIENT)
Age: 63
End: 2023-10-16

## 2023-10-17 ENCOUNTER — OUTPATIENT (OUTPATIENT)
Dept: OUTPATIENT SERVICES | Facility: HOSPITAL | Age: 63
LOS: 1 days | End: 2023-10-17
Payer: COMMERCIAL

## 2023-10-17 DIAGNOSIS — Z95.5 PRESENCE OF CORONARY ANGIOPLASTY IMPLANT AND GRAFT: Chronic | ICD-10-CM

## 2023-10-17 PROCEDURE — 93005 ELECTROCARDIOGRAM TRACING: CPT

## 2023-10-17 RX ORDER — HYDROCHLOROTHIAZIDE 25 MG
1 TABLET ORAL
Qty: 0 | Refills: 0 | DISCHARGE

## 2023-10-17 RX ORDER — METOPROLOL TARTRATE 50 MG
1 TABLET ORAL
Qty: 0 | Refills: 0 | DISCHARGE

## 2023-10-17 RX ORDER — RAMIPRIL 5 MG
1 CAPSULE ORAL
Qty: 0 | Refills: 0 | DISCHARGE

## 2023-10-17 RX ORDER — ASPIRIN/CALCIUM CARB/MAGNESIUM 324 MG
1 TABLET ORAL
Qty: 0 | Refills: 0 | DISCHARGE

## 2023-10-24 ENCOUNTER — NON-APPOINTMENT (OUTPATIENT)
Age: 63
End: 2023-10-24

## 2023-10-24 ENCOUNTER — APPOINTMENT (OUTPATIENT)
Dept: ELECTROPHYSIOLOGY | Facility: CLINIC | Age: 63
End: 2023-10-24
Payer: COMMERCIAL

## 2023-10-24 VITALS — OXYGEN SATURATION: 95 % | HEART RATE: 68 BPM | WEIGHT: 248 LBS | BODY MASS INDEX: 34.72 KG/M2 | HEIGHT: 71 IN

## 2023-10-24 DIAGNOSIS — I48.3 TYPICAL ATRIAL FLUTTER: ICD-10-CM

## 2023-10-24 PROCEDURE — 99214 OFFICE O/P EST MOD 30 MIN: CPT

## 2023-10-24 PROCEDURE — 93285 PRGRMG DEV EVAL SCRMS IP: CPT

## 2023-10-24 RX ORDER — HYDROCHLOROTHIAZIDE 12.5 MG/1
12.5 TABLET ORAL DAILY
Qty: 30 | Refills: 0 | Status: DISCONTINUED | COMMUNITY
End: 2023-10-24

## 2023-10-24 RX ORDER — LOSARTAN POTASSIUM 100 MG/1
100 TABLET, FILM COATED ORAL DAILY
Refills: 0 | Status: ACTIVE | COMMUNITY
Start: 2023-10-24

## 2023-10-24 RX ORDER — METOPROLOL SUCCINATE 25 MG/1
25 TABLET, EXTENDED RELEASE ORAL DAILY
Refills: 0 | Status: DISCONTINUED | COMMUNITY
End: 2023-10-24

## 2023-10-25 PROBLEM — I48.3 TYPICAL ATRIAL FLUTTER: Status: ACTIVE | Noted: 2023-02-05

## 2023-10-31 ENCOUNTER — APPOINTMENT (OUTPATIENT)
Dept: ELECTROPHYSIOLOGY | Facility: CLINIC | Age: 63
End: 2023-10-31

## 2023-11-02 DIAGNOSIS — I48.91 UNSPECIFIED ATRIAL FIBRILLATION: ICD-10-CM

## 2023-11-24 ENCOUNTER — NON-APPOINTMENT (OUTPATIENT)
Age: 63
End: 2023-11-24

## 2023-11-28 ENCOUNTER — APPOINTMENT (OUTPATIENT)
Dept: ELECTROPHYSIOLOGY | Facility: CLINIC | Age: 63
End: 2023-11-28
Payer: COMMERCIAL

## 2023-11-28 ENCOUNTER — NON-APPOINTMENT (OUTPATIENT)
Age: 63
End: 2023-11-28

## 2023-11-29 PROCEDURE — 93298 REM INTERROG DEV EVAL SCRMS: CPT | Mod: NC

## 2023-11-29 PROCEDURE — G2066: CPT | Mod: NC

## 2023-11-30 ENCOUNTER — OUTPATIENT (OUTPATIENT)
Dept: INPATIENT UNIT | Facility: HOSPITAL | Age: 63
LOS: 1 days | End: 2023-11-30
Payer: COMMERCIAL

## 2023-11-30 ENCOUNTER — TRANSCRIPTION ENCOUNTER (OUTPATIENT)
Age: 63
End: 2023-11-30

## 2023-11-30 VITALS
OXYGEN SATURATION: 99 % | DIASTOLIC BLOOD PRESSURE: 73 MMHG | TEMPERATURE: 98 F | SYSTOLIC BLOOD PRESSURE: 126 MMHG | HEART RATE: 72 BPM | RESPIRATION RATE: 18 BRPM

## 2023-11-30 VITALS
RESPIRATION RATE: 16 BRPM | OXYGEN SATURATION: 98 % | HEIGHT: 71 IN | DIASTOLIC BLOOD PRESSURE: 80 MMHG | HEART RATE: 76 BPM | WEIGHT: 251.99 LBS | TEMPERATURE: 98 F | SYSTOLIC BLOOD PRESSURE: 164 MMHG

## 2023-11-30 DIAGNOSIS — I48.3 TYPICAL ATRIAL FLUTTER: ICD-10-CM

## 2023-11-30 DIAGNOSIS — Z95.5 PRESENCE OF CORONARY ANGIOPLASTY IMPLANT AND GRAFT: Chronic | ICD-10-CM

## 2023-11-30 LAB
ANION GAP SERPL CALC-SCNC: 8 MMOL/L — SIGNIFICANT CHANGE UP (ref 5–17)
ANION GAP SERPL CALC-SCNC: 8 MMOL/L — SIGNIFICANT CHANGE UP (ref 5–17)
BUN SERPL-MCNC: 20 MG/DL — SIGNIFICANT CHANGE UP (ref 7–23)
BUN SERPL-MCNC: 20 MG/DL — SIGNIFICANT CHANGE UP (ref 7–23)
CALCIUM SERPL-MCNC: 9.4 MG/DL — SIGNIFICANT CHANGE UP (ref 8.4–10.5)
CALCIUM SERPL-MCNC: 9.4 MG/DL — SIGNIFICANT CHANGE UP (ref 8.4–10.5)
CHLORIDE SERPL-SCNC: 105 MMOL/L — SIGNIFICANT CHANGE UP (ref 96–108)
CHLORIDE SERPL-SCNC: 105 MMOL/L — SIGNIFICANT CHANGE UP (ref 96–108)
CO2 SERPL-SCNC: 27 MMOL/L — SIGNIFICANT CHANGE UP (ref 22–31)
CO2 SERPL-SCNC: 27 MMOL/L — SIGNIFICANT CHANGE UP (ref 22–31)
CREAT SERPL-MCNC: 0.69 MG/DL — SIGNIFICANT CHANGE UP (ref 0.5–1.3)
CREAT SERPL-MCNC: 0.69 MG/DL — SIGNIFICANT CHANGE UP (ref 0.5–1.3)
EGFR: 104 ML/MIN/1.73M2 — SIGNIFICANT CHANGE UP
EGFR: 104 ML/MIN/1.73M2 — SIGNIFICANT CHANGE UP
GLUCOSE SERPL-MCNC: 93 MG/DL — SIGNIFICANT CHANGE UP (ref 70–99)
GLUCOSE SERPL-MCNC: 93 MG/DL — SIGNIFICANT CHANGE UP (ref 70–99)
HCT VFR BLD CALC: 43.2 % — SIGNIFICANT CHANGE UP (ref 39–50)
HCT VFR BLD CALC: 43.2 % — SIGNIFICANT CHANGE UP (ref 39–50)
HGB BLD-MCNC: 14.1 G/DL — SIGNIFICANT CHANGE UP (ref 13–17)
HGB BLD-MCNC: 14.1 G/DL — SIGNIFICANT CHANGE UP (ref 13–17)
MCHC RBC-ENTMCNC: 29.9 PG — SIGNIFICANT CHANGE UP (ref 27–34)
MCHC RBC-ENTMCNC: 29.9 PG — SIGNIFICANT CHANGE UP (ref 27–34)
MCHC RBC-ENTMCNC: 32.6 GM/DL — SIGNIFICANT CHANGE UP (ref 32–36)
MCHC RBC-ENTMCNC: 32.6 GM/DL — SIGNIFICANT CHANGE UP (ref 32–36)
MCV RBC AUTO: 91.7 FL — SIGNIFICANT CHANGE UP (ref 80–100)
MCV RBC AUTO: 91.7 FL — SIGNIFICANT CHANGE UP (ref 80–100)
NRBC # BLD: 0 /100 WBCS — SIGNIFICANT CHANGE UP (ref 0–0)
NRBC # BLD: 0 /100 WBCS — SIGNIFICANT CHANGE UP (ref 0–0)
PLATELET # BLD AUTO: 299 K/UL — SIGNIFICANT CHANGE UP (ref 150–400)
PLATELET # BLD AUTO: 299 K/UL — SIGNIFICANT CHANGE UP (ref 150–400)
POTASSIUM SERPL-MCNC: 4.6 MMOL/L — SIGNIFICANT CHANGE UP (ref 3.5–5.3)
POTASSIUM SERPL-MCNC: 4.6 MMOL/L — SIGNIFICANT CHANGE UP (ref 3.5–5.3)
POTASSIUM SERPL-SCNC: 4.6 MMOL/L — SIGNIFICANT CHANGE UP (ref 3.5–5.3)
POTASSIUM SERPL-SCNC: 4.6 MMOL/L — SIGNIFICANT CHANGE UP (ref 3.5–5.3)
RBC # BLD: 4.71 M/UL — SIGNIFICANT CHANGE UP (ref 4.2–5.8)
RBC # BLD: 4.71 M/UL — SIGNIFICANT CHANGE UP (ref 4.2–5.8)
RBC # FLD: 12 % — SIGNIFICANT CHANGE UP (ref 10.3–14.5)
RBC # FLD: 12 % — SIGNIFICANT CHANGE UP (ref 10.3–14.5)
SODIUM SERPL-SCNC: 140 MMOL/L — SIGNIFICANT CHANGE UP (ref 135–145)
SODIUM SERPL-SCNC: 140 MMOL/L — SIGNIFICANT CHANGE UP (ref 135–145)
WBC # BLD: 6.4 K/UL — SIGNIFICANT CHANGE UP (ref 3.8–10.5)
WBC # BLD: 6.4 K/UL — SIGNIFICANT CHANGE UP (ref 3.8–10.5)
WBC # FLD AUTO: 6.4 K/UL — SIGNIFICANT CHANGE UP (ref 3.8–10.5)
WBC # FLD AUTO: 6.4 K/UL — SIGNIFICANT CHANGE UP (ref 3.8–10.5)

## 2023-11-30 PROCEDURE — C1760: CPT

## 2023-11-30 PROCEDURE — C1894: CPT

## 2023-11-30 PROCEDURE — C1731: CPT

## 2023-11-30 PROCEDURE — C1732: CPT

## 2023-11-30 PROCEDURE — 36415 COLL VENOUS BLD VENIPUNCTURE: CPT

## 2023-11-30 PROCEDURE — 93653 COMPRE EP EVAL TX SVT: CPT

## 2023-11-30 PROCEDURE — 93010 ELECTROCARDIOGRAM REPORT: CPT

## 2023-11-30 PROCEDURE — 86900 BLOOD TYPING SEROLOGIC ABO: CPT

## 2023-11-30 PROCEDURE — 85027 COMPLETE CBC AUTOMATED: CPT

## 2023-11-30 PROCEDURE — 80048 BASIC METABOLIC PNL TOTAL CA: CPT

## 2023-11-30 PROCEDURE — 33286 RMVL SUBQ CAR RHYTHM MNTR: CPT | Mod: 59

## 2023-11-30 PROCEDURE — 86850 RBC ANTIBODY SCREEN: CPT

## 2023-11-30 PROCEDURE — C1764: CPT

## 2023-11-30 PROCEDURE — 93005 ELECTROCARDIOGRAM TRACING: CPT

## 2023-11-30 PROCEDURE — C1766: CPT

## 2023-11-30 PROCEDURE — 33285 INSJ SUBQ CAR RHYTHM MNTR: CPT

## 2023-11-30 PROCEDURE — 93010 ELECTROCARDIOGRAM REPORT: CPT | Mod: 77

## 2023-11-30 PROCEDURE — 86901 BLOOD TYPING SEROLOGIC RH(D): CPT

## 2023-11-30 RX ORDER — ATORVASTATIN CALCIUM 80 MG/1
1 TABLET, FILM COATED ORAL
Qty: 0 | Refills: 0 | DISCHARGE

## 2023-11-30 RX ORDER — ATORVASTATIN CALCIUM 80 MG/1
40 TABLET, FILM COATED ORAL AT BEDTIME
Refills: 0 | Status: DISCONTINUED | OUTPATIENT
Start: 2023-11-30 | End: 2023-11-30

## 2023-11-30 RX ORDER — RIVAROXABAN 15 MG-20MG
20 KIT ORAL
Refills: 0 | Status: DISCONTINUED | OUTPATIENT
Start: 2023-11-30 | End: 2023-11-30

## 2023-11-30 RX ORDER — RIVAROXABAN 15 MG-20MG
1 KIT ORAL
Refills: 0 | DISCHARGE

## 2023-11-30 RX ORDER — CLOPIDOGREL BISULFATE 75 MG/1
75 TABLET, FILM COATED ORAL DAILY
Refills: 0 | Status: DISCONTINUED | OUTPATIENT
Start: 2023-11-30 | End: 2023-11-30

## 2023-11-30 NOTE — ASU DISCHARGE PLAN (ADULT/PEDIATRIC) - NS MD DC FALL RISK RISK
For information on Fall & Injury Prevention, visit: [email or web address] OR  [email or web address] OR  https://www.cdc.gov/steadi/patient.html

## 2023-11-30 NOTE — H&P CARDIOLOGY - LIVES WITH, PROFILE
son/children alone Oral Minoxidil Counseling- I discussed with the patient the risks of oral minoxidil including but not limited to shortness of breath, swelling of the feet or ankles, dizziness, lightheadedness, unwanted hair growth and allergic reaction.  The patient verbalized understanding of the proper use and possible adverse effects of oral minoxidil.  All of the patient's questions and concerns were addressed.

## 2023-11-30 NOTE — ASU DISCHARGE PLAN (ADULT/PEDIATRIC) - CARE PROVIDER_API CALL
Piyush Hennessy  [facility]  [address]  [place] [address]  Phone: [phone]  Fax: [phone]  Scheduled Appointment: 01/23/2024 02:30 PM   Masood Hennessy.  Cardiac Electrophysiology  [address] [place] [id number]  Phone: [phone]  Fax: [phone]  Scheduled Appointment: 01/23/2024 02:30 AM

## 2023-11-30 NOTE — H&P CARDIOLOGY - NSICDXFAMILYHX_GEN_ALL_CORE_FT
FAMILY HISTORY:  No pertinent family history in first degree relatives     FAMILY HISTORY:  Mother  Still living? No  Family history of cerebrovascular accident (CVA) in mother, Age at diagnosis: Age Unknown    Sibling  Still living? No  Family history of cerebrovascular accident (CVA), Age at diagnosis: Age Unknown

## 2023-11-30 NOTE — H&P CARDIOLOGY - HISTORY OF PRESENT ILLNESS
63 y/o male former smoker (30 PYH) with a pmh of HTN, HLD, PVD, CAD s/p PCI/VIRAL x 1 mLAD 99% 10/13/22, Asthma, COPD, Carotid stenosis, aflutter s/p ablation (caval tricuspid isthmus ablation) 1/2023. Despite ablation continues to demonstrate bouts of aflutter and presents today for ablation.        Dr. Helms, Cardiology   Dr. Rendon, PCP   Dr. Hennessy, EP  63 y/o male former smoker (30 PYH) with a pmh of HTN, HLD, PVD, CAD s/p PCI/VIRAL x 1 mLAD 99% 10/13/22, Asthma, COPD, former tobacco (quit 10/22: 60 pack yr hx). Carotid stenosis, aflutter s/p ablation (caval tricuspid isthmus ablation) 1/2023. Despite ablation continues to demonstrate bouts of aflutter and presents today for ablation.  Last dose of Xarelto 11/29 AM.      Dr. Helms, Cardiology   Dr. Rendon, PCP   Dr. Hennessy, EP  61 y/o male h/o  HTN, HLD, PVD, CAD s/p PCI/VIRAL x 1 mLAD 99% 10/13/22, Asthma, COPD, former tobacco (quit 10/22: 60 pack yr hx). Carotid stenosis, aflutter s/p ablation (caval tricuspid isthmus ablation) 1/2023. Despite ablation continues to demonstrate bouts of aflutter and presents today for ablation.  Last dose of Xarelto 11/29 AM.      Dr. Helms, Cardiology   Dr. Rendon, PCP   Dr. Hennessy, EP

## 2023-11-30 NOTE — ASU DISCHARGE PLAN (ADULT/PEDIATRIC) - PROVIDER TOKENS
PROVIDER:[TOKEN:[[id number]],SCHEDULEDAPPT:[01/23/2024],SCHEDULEDAPPTTIME:[02:30 PM]] PROVIDER:[TOKEN:[[id number]],SCHEDULEDAPPT:[01/23/2024],SCHEDULEDAPPTTIME:[02:30 AM]]

## 2023-11-30 NOTE — PATIENT PROFILE ADULT - NSPRESCRALCSIXMORE_GEN_A_NUR
Date: 2/18/2021    Indication: Fatty liver    FibroScan steatosis result (CAP score): 385 decibels per meter (dB/m)  Steatosis grade: S3    FibroScan fibrosis result: 5.7 kilopascals (kPa)  Fibrosis score: F0    Impression: Severe fatty liver with no fibrosis Never

## 2023-12-01 ENCOUNTER — NON-APPOINTMENT (OUTPATIENT)
Age: 63
End: 2023-12-01

## 2023-12-01 RX ORDER — LOSARTAN POTASSIUM 100 MG/1
1 TABLET, FILM COATED ORAL
Refills: 0 | DISCHARGE

## 2024-01-02 ENCOUNTER — NON-APPOINTMENT (OUTPATIENT)
Age: 64
End: 2024-01-02

## 2024-01-02 ENCOUNTER — APPOINTMENT (OUTPATIENT)
Dept: ELECTROPHYSIOLOGY | Facility: CLINIC | Age: 64
End: 2024-01-02
Payer: COMMERCIAL

## 2024-01-03 PROCEDURE — 93298 REM INTERROG DEV EVAL SCRMS: CPT

## 2024-01-23 ENCOUNTER — APPOINTMENT (OUTPATIENT)
Dept: ELECTROPHYSIOLOGY | Facility: CLINIC | Age: 64
End: 2024-01-23
Payer: COMMERCIAL

## 2024-01-23 VITALS
DIASTOLIC BLOOD PRESSURE: 93 MMHG | OXYGEN SATURATION: 94 % | HEIGHT: 71 IN | WEIGHT: 255 LBS | HEART RATE: 62 BPM | BODY MASS INDEX: 35.7 KG/M2 | SYSTOLIC BLOOD PRESSURE: 164 MMHG

## 2024-01-23 PROCEDURE — 93285 PRGRMG DEV EVAL SCRMS IP: CPT

## 2024-01-23 PROCEDURE — 99214 OFFICE O/P EST MOD 30 MIN: CPT

## 2024-01-23 RX ORDER — FLUTICASONE FUROATE, UMECLIDINIUM BROMIDE AND VILANTEROL TRIFENATATE 100; 62.5; 25 UG/1; UG/1; UG/1
100-62.5-25 POWDER RESPIRATORY (INHALATION)
Refills: 0 | Status: ACTIVE | COMMUNITY
Start: 2024-01-23

## 2024-01-23 NOTE — CARDIOLOGY SUMMARY
[de-identified] : today: SR @ 60bpm; left axis [de-identified] : 8/3/22: EF 60%, normal LA size (LA size 4.8 cm), and no significant valvular abnormalities. [de-identified] : 11/30/23: Successful ablation performed to re-establish block across the CTI  1/20/2023 Successful termination of counterclockwise, cavotricuspid isthmus dependent right atrial flutter with RF ablation Successful creation of a line of durable, rate-independent, bidirectional block across the cavotricuspid isthmus with RF ablation

## 2024-01-23 NOTE — PHYSICAL EXAM
[Well Developed] : well developed [Well Nourished] : well nourished [No Acute Distress] : no acute distress [Normal Conjunctiva] : normal conjunctiva [Normal Venous Pressure] : normal venous pressure [Normal S1, S2] : normal S1, S2 [No Rub] : no rub [No Murmur] : no murmur [No Gallop] : no gallop [Clear Lung Fields] : clear lung fields [No Respiratory Distress] : no respiratory distress  [Non Tender] : non-tender [Soft] : abdomen soft [Normal Bowel Sounds] : normal bowel sounds [Normal Gait] : normal gait [No Edema] : no edema [No Cyanosis] : no cyanosis [No Rash] : no rash [No Focal Deficits] : no focal deficits [Moves all extremities] : moves all extremities [Normal Speech] : normal speech [Alert and Oriented] : alert and oriented [Normal memory] : normal memory

## 2024-01-23 NOTE — HISTORY OF PRESENT ILLNESS
[FreeTextEntry1] : I had the pleasure of seeing Mark Oseguera today for follow-up in the arrhythmia clinic at [facility]. 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 Patient underwent a repeat CTI ablation on 11/30/23 along with a [employer] ILR implant. He has done well from a rhythm perspective since ablation and denies CP, palpitations, near syncope or syncope. He reports that he was diagnosed with COVID New Year's Lisa and recently diagnosed with COPD. He is still on Xarelto and has been compliant.  Interrogation of his ILR demonstrated no events.

## 2024-02-27 ENCOUNTER — APPOINTMENT (OUTPATIENT)
Dept: ELECTROPHYSIOLOGY | Facility: CLINIC | Age: 64
End: 2024-02-27
Payer: COMMERCIAL

## 2024-02-27 ENCOUNTER — NON-APPOINTMENT (OUTPATIENT)
Age: 64
End: 2024-02-27

## 2024-02-27 PROCEDURE — 93298 REM INTERROG DEV EVAL SCRMS: CPT

## 2024-04-02 ENCOUNTER — NON-APPOINTMENT (OUTPATIENT)
Age: 64
End: 2024-04-02

## 2024-04-02 ENCOUNTER — APPOINTMENT (OUTPATIENT)
Dept: ELECTROPHYSIOLOGY | Facility: CLINIC | Age: 64
End: 2024-04-02
Payer: COMMERCIAL

## 2024-04-02 PROCEDURE — 93298 REM INTERROG DEV EVAL SCRMS: CPT

## 2024-05-07 ENCOUNTER — NON-APPOINTMENT (OUTPATIENT)
Age: 64
End: 2024-05-07

## 2024-05-07 ENCOUNTER — APPOINTMENT (OUTPATIENT)
Dept: ELECTROPHYSIOLOGY | Facility: CLINIC | Age: 64
End: 2024-05-07
Payer: COMMERCIAL

## 2024-05-07 PROCEDURE — 93298 REM INTERROG DEV EVAL SCRMS: CPT

## 2024-06-11 ENCOUNTER — APPOINTMENT (OUTPATIENT)
Dept: ELECTROPHYSIOLOGY | Facility: CLINIC | Age: 64
End: 2024-06-11
Payer: COMMERCIAL

## 2024-06-11 ENCOUNTER — NON-APPOINTMENT (OUTPATIENT)
Age: 64
End: 2024-06-11

## 2024-06-11 PROCEDURE — 93298 REM INTERROG DEV EVAL SCRMS: CPT

## 2024-07-18 NOTE — PRE-ANESTHESIA EVALUATION ADULT - NSANTHNECKRD_ENT_A_CORE
"Chief Complaint  Establish Care (Pt is here today to establish care,pt presents gestational diabetes./)    Subjective        Abdon Andres presents to [facility]  History of Present Illness  Patient is a 21 y.o. female who presents to the office today to establish care. She is new to me and to this office.     She delivered her daughter on 5/29/24 and is breastfeeding.  She was induced at 37 weeks for preeclampsia.  She had gestational diabetes and was taking 16 units of Lantus in the morning and 20 units at night.  She states her blood sugars were well-controlled.  She has not been checking her blood glucose at home since delivery.  She denies blurred vision, dizziness, foot paresthesia, or increased urination.  She states that she does have increased thirst.  She is followed by TAJ Daniel.     She states that recently she has noticed bleeding with bowel movements and Dr. Moeller has referred her to gastroenterology.  She did have anemia during pregnancy and was prescribed ferrous sulfate although she has not been taking it.  He has a history of constipation and states that the ferrous sulfate exacerbated this.  She denies vaginal bleeding.    With eczema, followed by otology.  She had been receiving injections for treatment although she states she has stopped since her pregnancy.    She is fasting today.       Objective   Vital Signs:  /70   Pulse 89   Temp 98 °F (36.7 °C)   Ht 174 cm (68.5\")   Wt 82.6 kg (182 lb)   SpO2 98%   BMI 27.27 kg/m²   Estimated body mass index is 27.27 kg/m² as calculated from the following:    Height as of this encounter: 174 cm (68.5\").    Weight as of this encounter: 82.6 kg (182 lb).               Physical Exam  Constitutional:       Appearance: Normal appearance.   Cardiovascular:      Rate and Rhythm: Normal rate and regular rhythm.      Heart sounds: Normal heart sounds.   Pulmonary:      Effort: Pulmonary effort is normal.      " Breath sounds: Normal breath sounds.   Musculoskeletal:      Right lower leg: No edema.      Left lower leg: No edema.   Neurological:      Mental Status: She is alert.   Psychiatric:         Mood and Affect: Mood normal.        Result Review :    The following data was reviewed by: SINTIA Peguero on 07/18/2024:  Common labs          5/26/2024    02:50 5/28/2024    18:27 5/30/2024    06:53   Common Labs   Glucose 102  98     BUN 7  11     Creatinine 0.55  0.68     Sodium 138  135     Potassium 3.8  3.6     Chloride 105  102     Calcium 9.2  8.8     Albumin 3.3  3.5     Total Bilirubin 0.2  0.2     Alkaline Phosphatase 181  189     AST (SGOT) 15  14     ALT (SGPT) 20  21     WBC 8.35  9.58  10.62    Hemoglobin 9.8  10.1  9.9    Hematocrit 31.0  31.6  31.9    Platelets 280  319  267                   Assessment and Plan     Diagnoses and all orders for this visit:    1. Microcytic anemia (Primary)  Assessment & Plan:  Increase iron rich foods in the diet.  Discussed trying ferrous gluconate if she continues to be anemic on lab work.    Orders:  -     CBC & Differential  -     Ferritin  -     Iron Profile  -     Reticulocytes    2. History of gestational diabetes  Assessment & Plan:  Instructed her to continue glucose monitoring once a day at home.  Instructed her to notify office for fasting readings higher than 100.     Orders:  -     Hemoglobin A1c    3. Encounter to establish care    4. Healthcare maintenance  -     CBC & Differential  -     Comprehensive metabolic panel    Patient agrees with plan of care and understands instructions. Call if worsening symptoms or any problems or concerns.            Follow Up     Return in about 6 months (around 1/18/2025) for Annual physical.  Patient was given instructions and counseling regarding her condition or for health maintenance advice. Please see specific information pulled into the AVS if appropriate.          No

## 2024-07-23 ENCOUNTER — NON-APPOINTMENT (OUTPATIENT)
Age: 64
End: 2024-07-23

## 2024-07-23 ENCOUNTER — APPOINTMENT (OUTPATIENT)
Dept: ELECTROPHYSIOLOGY | Facility: CLINIC | Age: 64
End: 2024-07-23
Payer: COMMERCIAL

## 2024-07-23 VITALS
WEIGHT: 255 LBS | HEART RATE: 60 BPM | SYSTOLIC BLOOD PRESSURE: 134 MMHG | OXYGEN SATURATION: 97 % | BODY MASS INDEX: 35.57 KG/M2 | DIASTOLIC BLOOD PRESSURE: 78 MMHG

## 2024-07-23 DIAGNOSIS — I48.3 TYPICAL ATRIAL FLUTTER: ICD-10-CM

## 2024-07-23 PROCEDURE — 93285 PRGRMG DEV EVAL SCRMS IP: CPT

## 2024-07-23 PROCEDURE — 99215 OFFICE O/P EST HI 40 MIN: CPT

## 2024-07-23 NOTE — PHYSICAL EXAM
[Well Developed] : well developed [Well Nourished] : well nourished [No Acute Distress] : no acute distress [Normal Conjunctiva] : normal conjunctiva [Normal Venous Pressure] : normal venous pressure [Normal S1, S2] : normal S1, S2 [No Murmur] : no murmur [No Rub] : no rub [No Gallop] : no gallop [Clear Lung Fields] : clear lung fields [No Respiratory Distress] : no respiratory distress  [Soft] : abdomen soft [Non Tender] : non-tender [Normal Bowel Sounds] : normal bowel sounds [Normal Gait] : normal gait [No Edema] : no edema [No Cyanosis] : no cyanosis [No Rash] : no rash [Moves all extremities] : moves all extremities [No Focal Deficits] : no focal deficits [Normal Speech] : normal speech [Alert and Oriented] : alert and oriented [Normal memory] : normal memory

## 2024-07-24 NOTE — CARDIOLOGY SUMMARY
[de-identified] : today: SR @ 60bpm; left axis [de-identified] : 8/3/22: EF 60%, normal LA size (LA size 4.8 cm), and no significant valvular abnormalities. [de-identified] : 11/30/23: Successful ablation performed to re-establish block across the CTI  1/20/2023 Successful termination of counterclockwise, cavotricuspid isthmus dependent right atrial flutter with RF ablation Successful creation of a line of durable, rate-independent, bidirectional block across the cavotricuspid isthmus with RF ablation

## 2024-07-24 NOTE — CARDIOLOGY SUMMARY
[de-identified] : today: SR @ 60bpm; left axis [de-identified] : 8/3/22: EF 60%, normal LA size (LA size 4.8 cm), and no significant valvular abnormalities. [de-identified] : 11/30/23: Successful ablation performed to re-establish block across the CTI  1/20/2023 Successful termination of counterclockwise, cavotricuspid isthmus dependent right atrial flutter with RF ablation Successful creation of a line of durable, rate-independent, bidirectional block across the cavotricuspid isthmus with RF ablation

## 2024-07-24 NOTE — DISCUSSION/SUMMARY
[FreeTextEntry1] : In summary, Mr. Oseguera is status post a repeat CTI ablation in November 2023. He has an ILR in place and has shown no recurrence of arrhythmias. As he has only demonstrated atrial flutter, we will discontinue his anticoagulation. We did discuss the role of ILR in monitoring for any occurrences of AF as there is an increase in incidence of developing AF with his history of AFL. If AF is seen on ILR monitoring, he understands that he will be placed back on AC. He will continue remote monitoring and follow up in clinic in 6 months.   He also admits to a syncopal event.  Historically the event sounds vagal.  He was reassured that the ILR showed no arrhythmia.  We discussed empiric lifestyle modifications as well as the utility of the patient activator.

## 2024-07-24 NOTE — HISTORY OF PRESENT ILLNESS
[FreeTextEntry1] : I had the pleasure of seeing Mark Oseguera today for follow-up in the arrhythmia clinic at [facility]. 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 Patient underwent a repeat CTI ablation on 11/30/23 along with a Medtronic ILR implant. He has done well from a rhythm perspective since ablation and denies CP, palpitations,  He had an episode of syncope recently. He did have a couple of drinks, No chest pain. NO palpitations. NO shortness of breath.   Interrogation of his ILR demonstrated no events.

## 2024-07-24 NOTE — HISTORY OF PRESENT ILLNESS
[FreeTextEntry1] : I had the pleasure of seeing Mark Oseguera today for follow-up in the arrhythmia clinic at [facility]. 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 Patient underwent a repeat CTI ablation on 11/30/23 along with a Medtronic ILR implant. He has done well from a rhythm perspective since ablation and denies CP, palpitations,  He had an episode of syncope recently. He did have a couple of drinks, No chest pain. NO palpitations. NO shortness of breath.   Interrogation of his ILR demonstrated no events.

## 2024-08-27 ENCOUNTER — APPOINTMENT (OUTPATIENT)
Dept: ELECTROPHYSIOLOGY | Facility: CLINIC | Age: 64
End: 2024-08-27

## 2024-08-27 ENCOUNTER — NON-APPOINTMENT (OUTPATIENT)
Age: 64
End: 2024-08-27

## 2024-08-27 PROCEDURE — 93298 REM INTERROG DEV EVAL SCRMS: CPT

## 2024-10-01 ENCOUNTER — NON-APPOINTMENT (OUTPATIENT)
Age: 64
End: 2024-10-01

## 2024-10-01 ENCOUNTER — APPOINTMENT (OUTPATIENT)
Dept: ELECTROPHYSIOLOGY | Facility: CLINIC | Age: 64
End: 2024-10-01

## 2024-10-01 PROCEDURE — 93298 REM INTERROG DEV EVAL SCRMS: CPT

## 2024-11-05 ENCOUNTER — NON-APPOINTMENT (OUTPATIENT)
Age: 64
End: 2024-11-05

## 2024-11-05 ENCOUNTER — APPOINTMENT (OUTPATIENT)
Dept: ELECTROPHYSIOLOGY | Facility: CLINIC | Age: 64
End: 2024-11-05
Payer: COMMERCIAL

## 2024-11-05 PROCEDURE — 93298 REM INTERROG DEV EVAL SCRMS: CPT

## 2024-12-10 ENCOUNTER — APPOINTMENT (OUTPATIENT)
Dept: ELECTROPHYSIOLOGY | Facility: CLINIC | Age: 64
End: 2024-12-10
Payer: COMMERCIAL

## 2024-12-10 ENCOUNTER — NON-APPOINTMENT (OUTPATIENT)
Age: 64
End: 2024-12-10

## 2024-12-10 PROCEDURE — 93298 REM INTERROG DEV EVAL SCRMS: CPT

## 2025-01-21 ENCOUNTER — APPOINTMENT (OUTPATIENT)
Dept: ELECTROPHYSIOLOGY | Facility: CLINIC | Age: 65
End: 2025-01-21
Payer: COMMERCIAL

## 2025-01-21 VITALS — HEART RATE: 54 BPM | OXYGEN SATURATION: 98 % | DIASTOLIC BLOOD PRESSURE: 83 MMHG | SYSTOLIC BLOOD PRESSURE: 186 MMHG

## 2025-01-21 VITALS — SYSTOLIC BLOOD PRESSURE: 172 MMHG | DIASTOLIC BLOOD PRESSURE: 95 MMHG

## 2025-01-21 DIAGNOSIS — I48.92 UNSPECIFIED ATRIAL FLUTTER: ICD-10-CM

## 2025-01-21 PROCEDURE — 93285 PRGRMG DEV EVAL SCRMS IP: CPT

## 2025-01-21 PROCEDURE — 99215 OFFICE O/P EST HI 40 MIN: CPT

## 2025-01-21 RX ORDER — RIVAROXABAN 20 MG/1
20 TABLET, FILM COATED ORAL
Qty: 30 | Refills: 6 | Status: ACTIVE | COMMUNITY
Start: 1900-01-01 | End: 1900-01-01

## 2025-01-22 PROBLEM — I48.92 ATRIAL FLUTTER: Status: ACTIVE | Noted: 2025-01-21

## 2025-02-25 ENCOUNTER — APPOINTMENT (OUTPATIENT)
Dept: ELECTROPHYSIOLOGY | Facility: CLINIC | Age: 65
End: 2025-02-25

## 2025-02-25 PROCEDURE — 93298 REM INTERROG DEV EVAL SCRMS: CPT

## 2025-04-01 ENCOUNTER — APPOINTMENT (OUTPATIENT)
Dept: ELECTROPHYSIOLOGY | Facility: CLINIC | Age: 65
End: 2025-04-01
Payer: COMMERCIAL

## 2025-04-01 ENCOUNTER — NON-APPOINTMENT (OUTPATIENT)
Age: 65
End: 2025-04-01

## 2025-04-01 PROCEDURE — 93298 REM INTERROG DEV EVAL SCRMS: CPT

## 2025-05-06 ENCOUNTER — NON-APPOINTMENT (OUTPATIENT)
Age: 65
End: 2025-05-06

## 2025-05-06 ENCOUNTER — APPOINTMENT (OUTPATIENT)
Dept: ELECTROPHYSIOLOGY | Facility: CLINIC | Age: 65
End: 2025-05-06

## 2025-05-06 PROCEDURE — 93298 REM INTERROG DEV EVAL SCRMS: CPT

## 2025-06-10 ENCOUNTER — APPOINTMENT (OUTPATIENT)
Dept: ELECTROPHYSIOLOGY | Facility: CLINIC | Age: 65
End: 2025-06-10

## 2025-06-10 PROCEDURE — 93298 REM INTERROG DEV EVAL SCRMS: CPT

## 2025-06-26 ENCOUNTER — APPOINTMENT (OUTPATIENT)
Dept: PULMONOLOGY | Facility: CLINIC | Age: 65
End: 2025-06-26

## 2025-07-01 ENCOUNTER — APPOINTMENT (OUTPATIENT)
Dept: ELECTROPHYSIOLOGY | Facility: CLINIC | Age: 65
End: 2025-07-01
Payer: COMMERCIAL

## 2025-07-01 VITALS
HEIGHT: 71 IN | HEART RATE: 70 BPM | OXYGEN SATURATION: 95 % | SYSTOLIC BLOOD PRESSURE: 134 MMHG | DIASTOLIC BLOOD PRESSURE: 87 MMHG | WEIGHT: 236 LBS | BODY MASS INDEX: 33.04 KG/M2

## 2025-07-01 PROCEDURE — 93285 PRGRMG DEV EVAL SCRMS IP: CPT

## 2025-07-01 PROCEDURE — 99213 OFFICE O/P EST LOW 20 MIN: CPT

## 2025-07-14 ENCOUNTER — OUTPATIENT (OUTPATIENT)
Dept: OUTPATIENT SERVICES | Facility: HOSPITAL | Age: 65
LOS: 1 days | End: 2025-07-14
Payer: COMMERCIAL

## 2025-07-14 ENCOUNTER — TRANSCRIPTION ENCOUNTER (OUTPATIENT)
Age: 65
End: 2025-07-14

## 2025-07-14 VITALS
HEART RATE: 66 BPM | RESPIRATION RATE: 16 BRPM | SYSTOLIC BLOOD PRESSURE: 155 MMHG | OXYGEN SATURATION: 96 % | DIASTOLIC BLOOD PRESSURE: 73 MMHG

## 2025-07-14 VITALS
HEART RATE: 62 BPM | SYSTOLIC BLOOD PRESSURE: 179 MMHG | WEIGHT: 235.89 LBS | OXYGEN SATURATION: 97 % | RESPIRATION RATE: 18 BRPM | DIASTOLIC BLOOD PRESSURE: 94 MMHG | HEIGHT: 71 IN | TEMPERATURE: 98 F

## 2025-07-14 DIAGNOSIS — R07.89 OTHER CHEST PAIN: ICD-10-CM

## 2025-07-14 DIAGNOSIS — Z95.5 PRESENCE OF CORONARY ANGIOPLASTY IMPLANT AND GRAFT: Chronic | ICD-10-CM

## 2025-07-14 LAB
ANION GAP SERPL CALC-SCNC: 13 MMOL/L — SIGNIFICANT CHANGE UP (ref 5–17)
BUN SERPL-MCNC: 19 MG/DL — SIGNIFICANT CHANGE UP (ref 7–23)
CALCIUM SERPL-MCNC: 9.1 MG/DL — SIGNIFICANT CHANGE UP (ref 8.4–10.5)
CHLORIDE SERPL-SCNC: 104 MMOL/L — SIGNIFICANT CHANGE UP (ref 96–108)
CO2 SERPL-SCNC: 22 MMOL/L — SIGNIFICANT CHANGE UP (ref 22–31)
CREAT SERPL-MCNC: 0.73 MG/DL — SIGNIFICANT CHANGE UP (ref 0.5–1.3)
EGFR: 102 ML/MIN/1.73M2 — SIGNIFICANT CHANGE UP
EGFR: 102 ML/MIN/1.73M2 — SIGNIFICANT CHANGE UP
GLUCOSE SERPL-MCNC: 95 MG/DL — SIGNIFICANT CHANGE UP (ref 70–99)
HCT VFR BLD CALC: 45.4 % — SIGNIFICANT CHANGE UP (ref 39–50)
HGB BLD-MCNC: 14.9 G/DL — SIGNIFICANT CHANGE UP (ref 13–17)
MAGNESIUM SERPL-MCNC: 1.9 MG/DL — SIGNIFICANT CHANGE UP (ref 1.6–2.6)
MCHC RBC-ENTMCNC: 30.7 PG — SIGNIFICANT CHANGE UP (ref 27–34)
MCHC RBC-ENTMCNC: 32.8 G/DL — SIGNIFICANT CHANGE UP (ref 32–36)
MCV RBC AUTO: 93.6 FL — SIGNIFICANT CHANGE UP (ref 80–100)
NRBC # BLD AUTO: 0 K/UL — SIGNIFICANT CHANGE UP (ref 0–0)
NRBC # FLD: 0 K/UL — SIGNIFICANT CHANGE UP (ref 0–0)
NRBC BLD AUTO-RTO: 0 /100 WBCS — SIGNIFICANT CHANGE UP (ref 0–0)
PLATELET # BLD AUTO: 294 K/UL — SIGNIFICANT CHANGE UP (ref 150–400)
PMV BLD: 9.9 FL — SIGNIFICANT CHANGE UP (ref 7–13)
POTASSIUM SERPL-MCNC: 3.9 MMOL/L — SIGNIFICANT CHANGE UP (ref 3.5–5.3)
POTASSIUM SERPL-SCNC: 3.9 MMOL/L — SIGNIFICANT CHANGE UP (ref 3.5–5.3)
RBC # BLD: 4.85 M/UL — SIGNIFICANT CHANGE UP (ref 4.2–5.8)
RBC # FLD: 12 % — SIGNIFICANT CHANGE UP (ref 10.3–14.5)
SODIUM SERPL-SCNC: 139 MMOL/L — SIGNIFICANT CHANGE UP (ref 135–145)
WBC # BLD: 7.33 K/UL — SIGNIFICANT CHANGE UP (ref 3.8–10.5)
WBC # FLD AUTO: 7.33 K/UL — SIGNIFICANT CHANGE UP (ref 3.8–10.5)

## 2025-07-14 PROCEDURE — C1769: CPT

## 2025-07-14 PROCEDURE — C9600: CPT | Mod: RC

## 2025-07-14 PROCEDURE — 92928 PRQ TCAT PLMT NTRAC ST 1 LES: CPT | Mod: RC

## 2025-07-14 PROCEDURE — C1874: CPT

## 2025-07-14 PROCEDURE — 93454 CORONARY ARTERY ANGIO S&I: CPT | Mod: 26,59

## 2025-07-14 PROCEDURE — 93010 ELECTROCARDIOGRAM REPORT: CPT

## 2025-07-14 PROCEDURE — C1887: CPT

## 2025-07-14 PROCEDURE — 93454 CORONARY ARTERY ANGIO S&I: CPT | Mod: 59

## 2025-07-14 PROCEDURE — 83735 ASSAY OF MAGNESIUM: CPT

## 2025-07-14 PROCEDURE — C1894: CPT

## 2025-07-14 PROCEDURE — 93005 ELECTROCARDIOGRAM TRACING: CPT

## 2025-07-14 PROCEDURE — 80048 BASIC METABOLIC PNL TOTAL CA: CPT

## 2025-07-14 PROCEDURE — 99152 MOD SED SAME PHYS/QHP 5/>YRS: CPT

## 2025-07-14 PROCEDURE — 85027 COMPLETE CBC AUTOMATED: CPT

## 2025-07-14 RX ORDER — FENTANYL CITRATE-0.9 % NACL/PF 100MCG/2ML
25 SYRINGE (ML) INTRAVENOUS ONCE
Refills: 0 | Status: DISCONTINUED | OUTPATIENT
Start: 2025-07-14 | End: 2025-07-14

## 2025-07-14 RX ORDER — ASPIRIN 325 MG
1 TABLET ORAL
Qty: 7 | Refills: 3
Start: 2025-07-14 | End: 2025-08-10

## 2025-07-14 RX ORDER — SEMAGLUTIDE 1 MG/.5ML
0.5 INJECTION, SOLUTION SUBCUTANEOUS
Refills: 0 | DISCHARGE

## 2025-07-14 RX ORDER — FENTANYL CITRATE-0.9 % NACL/PF 100MCG/2ML
25 SYRINGE (ML) INTRAVENOUS ONCE
Refills: 0 | Status: DISCONTINUED | OUTPATIENT
Start: 2025-07-14 | End: 2025-07-28

## 2025-07-14 RX ORDER — CLOPIDOGREL BISULFATE 75 MG/1
75 TABLET, FILM COATED ORAL DAILY
Refills: 0 | Status: DISCONTINUED | OUTPATIENT
Start: 2025-07-14 | End: 2025-07-28

## 2025-07-14 RX ORDER — FLUTICASONE FUROATE, UMECLIDINIUM BROMIDE AND VILANTEROL TRIFENATATE 100; 62.5; 25 UG/1; UG/1; UG/1
1 POWDER RESPIRATORY (INHALATION)
Refills: 0 | DISCHARGE

## 2025-07-14 RX ORDER — ASPIRIN 325 MG
81 TABLET ORAL DAILY
Refills: 0 | Status: DISCONTINUED | OUTPATIENT
Start: 2025-07-15 | End: 2025-07-28

## 2025-07-14 RX ORDER — ASPIRIN 325 MG
1 TABLET ORAL
Qty: 90 | Refills: 3
Start: 2025-07-14 | End: 2026-07-08

## 2025-07-14 RX ORDER — CLOPIDOGREL BISULFATE 75 MG/1
1 TABLET, FILM COATED ORAL
Qty: 90 | Refills: 3
Start: 2025-07-14 | End: 2026-07-08

## 2025-07-14 RX ADMIN — Medication 25 MICROGRAM(S): at 14:48

## 2025-07-14 RX ADMIN — Medication 25 MICROGRAM(S): at 14:08

## 2025-07-14 RX ADMIN — Medication 75 MILLILITER(S): at 13:53

## 2025-07-14 RX ADMIN — CLOPIDOGREL BISULFATE 75 MILLIGRAM(S): 75 TABLET, FILM COATED ORAL at 10:20

## 2025-07-14 RX ADMIN — Medication 25 MICROGRAM(S): at 15:05

## 2025-07-14 RX ADMIN — Medication 25 MICROGRAM(S): at 13:53

## 2025-07-14 NOTE — H&P CARDIOLOGY - NSICDXFAMILYHX_GEN_ALL_CORE_FT
FAMILY HISTORY:  Mother  Still living? No  Family history of cerebrovascular accident (CVA) in mother, Age at diagnosis: Age Unknown    Sibling  Still living? No  Family history of cerebrovascular accident (CVA), Age at diagnosis: Age Unknown

## 2025-07-14 NOTE — ASU DISCHARGE PLAN (ADULT/PEDIATRIC) - ASU DC SPECIAL INSTRUCTIONSFT
Wound Care: The day AFTER your procedure:  Remove bandage GENTLY, and clean using mild soap and gentle warm, water stream, pat dry.   Leave OPEN to air. YOU MAY SHOWER  DO NOT SOAK your procedure site for 1 week (no baths, no pools, no tubs)  Check your wrist or groin puncture site everyday.  A small amount of soreness and bruising is normal  ACTIVITY for the next 24 hours:  1) DO NOT DRIVE  2) DO NOT make any important decisions or sign legal documents  3) DO NOT operate heavy SNOBSWAPary   You may resume sexual activity in 48 hours, unless otherwise instructed by your cardiologist  If your procedure was done through the WRIST, for the NEXT 3 DAYS:  AVOID pushing pulling  or repeated movement of that hand and wrist (eg: typing, hammering)  DO NOT LIFT anything more than 5 lbs     If your procedure was done through the GROIN, for the NEXT 5 DAYS:  Limit climbing the stairs, no strenuous activities, no pushing, no pulling, no straining  Do not lift anything that is 10lbs or heavier   MEDICATION:  Take your medications as explained (see discharge paperwork). If you received a stent, you will be taking medication to KEEP YOUR STENT OPEN. DO NOT STOP THESE MEDICATIONS UNLESS DIRECTED BY A CARDIOLOGIST  If you smoke, WE RECOMMEND YOU QUIT (you may call [phone] the [facility] if you need assistance)   FOLLOW UP: Please follow up with your private cardiologist (insert name) in 2 weeks.  Please call immediately for an appointment upon discharge from the hospital.    ***CALL YOUR DOCTOR***   If you experience: chest pain, fever, chills, body aches, or severe pain, swelling, redness, heat or yellow discharge at incision site or if you experience bleeding, temperature change, numbness or excruciating pain at the procedural site  If you are unable to reach your doctor, you may contact:    Cardiology Office at [facility] at [phone]   Cardiac Short Stay Unit (CSSU) [phone]    Cardiac Recovery Suite (CRS) [phone]

## 2025-07-14 NOTE — ASU PATIENT PROFILE, ADULT - PACKS YRS CALCULATION
Patient is asking if the UA culture is back yet - patient was treated on 12/3/21 with Bactrim DS BID x5 days.      Reply message sent to patient requesting more information   15

## 2025-07-14 NOTE — ASU DISCHARGE PLAN (ADULT/PEDIATRIC) - CARE PROVIDER_API CALL
Piyush Helms  Cardiovascular Disease  [facility], [address] [address]  Phone: [phone]  Fax: [phone]  Follow Up Time: 2 weeks

## 2025-07-14 NOTE — H&P CARDIOLOGY - HISTORY OF PRESENT ILLNESS
64 year old male with PMHx of CAD s/p VIRAL to mLAD in 2022, Aflutter/Afib on Xarelto (last dose 7/11 AM) with ILR (followed by Dr. Hennessy), HLD, Carotid Stenosis, COPD, Asthma, presented to his cardiologist Dr. Helms with occasional episodes of moderate substernal chest pain radiating to his left chest, that began approximately 3 weeks ago, occurring at rest and is similar to the chest pain prior to his LAD PCI in 2022. Patient describes it as an aching feeling and has no reported associated symptoms. Given symptoms and history of PCI, patient now presents for [facility] w/ Dr. Vargas today. Currently denies chest pain, palpitations, SOB, dizziness.    Cards: Piyush Helms    Cath Report:  Study Date:     10/13/2022   Cath Lab Report    Diagnostic Cardiologist:       Maurisio Bruno MD   Referring Physician:           Piyush Helms, DO     Procedures Performed   1.    Arterial Access - Right Radial   2.    Diagnostic Coronary Angiography   3.    PCI: VIRAL     Indications:               Abnormal Cardiovascular Testing   CCS Class II     Conclusions: There is a severe stenotic lesion of the mid LAD. A successful  intervention of the mLAD was performed with VIRAL placement.  Recommendations: Continue dual antiplatelet therapy for at least six months duration.

## 2025-07-14 NOTE — ASU DISCHARGE PLAN (ADULT/PEDIATRIC) - FINANCIAL ASSISTANCE
[facility] provides services at a reduced cost to those who are determined to be eligible through [facility]’s financial assistance program. Information regarding [facility]’s financial assistance program can be found by going to [email or web address] or by calling [phone].

## 2025-08-01 ENCOUNTER — APPOINTMENT (OUTPATIENT)
Dept: ELECTROPHYSIOLOGY | Facility: CLINIC | Age: 65
End: 2025-08-01
Payer: COMMERCIAL

## 2025-08-01 ENCOUNTER — NON-APPOINTMENT (OUTPATIENT)
Age: 65
End: 2025-08-01

## 2025-08-01 PROCEDURE — 93298 REM INTERROG DEV EVAL SCRMS: CPT

## 2025-09-05 ENCOUNTER — APPOINTMENT (OUTPATIENT)
Dept: ELECTROPHYSIOLOGY | Facility: CLINIC | Age: 65
End: 2025-09-05

## 2025-09-05 PROCEDURE — 93298 REM INTERROG DEV EVAL SCRMS: CPT
